# Patient Record
Sex: MALE | Race: WHITE | NOT HISPANIC OR LATINO | Employment: PART TIME | URBAN - METROPOLITAN AREA
[De-identification: names, ages, dates, MRNs, and addresses within clinical notes are randomized per-mention and may not be internally consistent; named-entity substitution may affect disease eponyms.]

---

## 2019-07-18 ENCOUNTER — TRANSCRIBE ORDERS (OUTPATIENT)
Dept: NUTRITION | Facility: HOSPITAL | Age: 44
End: 2019-07-18

## 2019-07-18 DIAGNOSIS — E66.9 CLASS 1 OBESITY WITH BODY MASS INDEX (BMI) OF 32.0 TO 32.9 IN ADULT, UNSPECIFIED OBESITY TYPE, UNSPECIFIED WHETHER SERIOUS COMORBIDITY PRESENT: ICD-10-CM

## 2019-07-18 DIAGNOSIS — E10.649 UNCONTROLLED TYPE 1 DIABETES MELLITUS WITH HYPOGLYCEMIA, UNSPECIFIED HYPOGLYCEMIA COMA STATUS (HCC): Primary | ICD-10-CM

## 2019-07-18 DIAGNOSIS — E78.00 PURE HYPERCHOLESTEROLEMIA: ICD-10-CM

## 2019-07-22 ENCOUNTER — TRANSCRIBE ORDERS (OUTPATIENT)
Dept: DIABETES SERVICES | Facility: HOSPITAL | Age: 44
End: 2019-07-22

## 2019-07-22 DIAGNOSIS — E10.649 UNCONTROLLED TYPE 1 DIABETES MELLITUS WITH HYPOGLYCEMIA WITHOUT COMA (HCC): ICD-10-CM

## 2019-07-22 DIAGNOSIS — E66.9 OBESITY, UNSPECIFIED CLASSIFICATION, UNSPECIFIED OBESITY TYPE, UNSPECIFIED WHETHER SERIOUS COMORBIDITY PRESENT: ICD-10-CM

## 2019-07-22 DIAGNOSIS — E10.649 TYPE 1 DIABETES MELLITUS WITH HYPOGLYCEMIA AND WITHOUT COMA (HCC): ICD-10-CM

## 2019-07-22 DIAGNOSIS — E78.00 ELEVATED CHOLESTEROL: Primary | ICD-10-CM

## 2019-07-24 ENCOUNTER — HOSPITAL ENCOUNTER (OUTPATIENT)
Dept: DIABETES SERVICES | Facility: HOSPITAL | Age: 44
Setting detail: RECURRING SERIES
Discharge: HOME | End: 2019-07-24

## 2019-07-24 PROCEDURE — G0108 DIAB MANAGE TRN  PER INDIV: HCPCS | Performed by: DIETITIAN, REGISTERED

## 2019-08-21 ENCOUNTER — HOSPITAL ENCOUNTER (OUTPATIENT)
Dept: DIABETES SERVICES | Facility: HOSPITAL | Age: 44
Setting detail: RECURRING SERIES
Discharge: HOME | End: 2019-08-21

## 2020-11-20 ENCOUNTER — OFFICE VISIT (OUTPATIENT)
Dept: ENDOCRINOLOGY | Facility: CLINIC | Age: 45
End: 2020-11-20

## 2020-11-20 VITALS
HEART RATE: 99 BPM | TEMPERATURE: 97.3 F | WEIGHT: 187.4 LBS | BODY MASS INDEX: 31.99 KG/M2 | OXYGEN SATURATION: 99 % | DIASTOLIC BLOOD PRESSURE: 74 MMHG | HEIGHT: 64 IN | SYSTOLIC BLOOD PRESSURE: 116 MMHG

## 2020-11-20 DIAGNOSIS — E10.65 UNCONTROLLED TYPE 1 DIABETES MELLITUS WITH HYPERGLYCEMIA (HCC): Primary | ICD-10-CM

## 2020-11-20 DIAGNOSIS — Z96.41 INSULIN PUMP IN PLACE: ICD-10-CM

## 2020-11-20 DIAGNOSIS — E10.649 HYPOGLYCEMIA DUE TO TYPE 1 DIABETES MELLITUS (HCC): ICD-10-CM

## 2020-11-20 PROBLEM — E10.29 TYPE 1 DIABETES MELLITUS WITH MICROALBUMINURIA: Status: ACTIVE | Noted: 2020-11-20

## 2020-11-20 PROBLEM — R80.9 TYPE 1 DIABETES MELLITUS WITH MICROALBUMINURIA: Status: ACTIVE | Noted: 2020-11-20

## 2020-11-20 PROBLEM — E10.319 TYPE 1 DIABETES MELLITUS WITH RETINOPATHY: Status: ACTIVE | Noted: 2020-11-20

## 2020-11-20 PROBLEM — E03.9 PRIMARY HYPOTHYROIDISM: Status: ACTIVE | Noted: 2020-11-20

## 2020-11-20 LAB
EXPIRATION DATE: NORMAL
HBA1C MFR BLD: 6.8 %
Lab: NORMAL

## 2020-11-20 PROCEDURE — 83036 HEMOGLOBIN GLYCOSYLATED A1C: CPT | Performed by: INTERNAL MEDICINE

## 2020-11-20 PROCEDURE — 95251 CONT GLUC MNTR ANALYSIS I&R: CPT | Performed by: INTERNAL MEDICINE

## 2020-11-20 PROCEDURE — 99214 OFFICE O/P EST MOD 30 MIN: CPT | Performed by: INTERNAL MEDICINE

## 2020-11-20 RX ORDER — ATORVASTATIN CALCIUM 20 MG/1
1 TABLET, FILM COATED ORAL DAILY
COMMUNITY
Start: 2020-11-19 | End: 2020-11-20 | Stop reason: SDUPTHER

## 2020-11-20 RX ORDER — GABAPENTIN 400 MG/1
800 CAPSULE ORAL 3 TIMES DAILY
Qty: 180 CAPSULE | Refills: 5 | Status: SHIPPED | OUTPATIENT
Start: 2020-11-20 | End: 2021-06-10

## 2020-11-20 RX ORDER — ASPIRIN 81 MG/1
81 TABLET ORAL DAILY
COMMUNITY

## 2020-11-20 RX ORDER — GABAPENTIN 400 MG/1
2 CAPSULE ORAL 3 TIMES DAILY
COMMUNITY
Start: 2020-11-19 | End: 2020-11-20 | Stop reason: SDUPTHER

## 2020-11-20 RX ORDER — LOSARTAN POTASSIUM 100 MG/1
100 TABLET ORAL DAILY
Qty: 90 TABLET | Refills: 3 | Status: SHIPPED | OUTPATIENT
Start: 2020-11-20 | End: 2021-06-28 | Stop reason: SDUPTHER

## 2020-11-20 RX ORDER — LEVOTHYROXINE SODIUM 0.1 MG/1
100 TABLET ORAL DAILY
Qty: 90 TABLET | Refills: 3 | Status: SHIPPED | OUTPATIENT
Start: 2020-11-20 | End: 2021-06-28 | Stop reason: SDUPTHER

## 2020-11-20 RX ORDER — ATORVASTATIN CALCIUM 20 MG/1
20 TABLET, FILM COATED ORAL DAILY
Qty: 90 TABLET | Refills: 3 | Status: SHIPPED | OUTPATIENT
Start: 2020-11-20 | End: 2021-06-28 | Stop reason: SDUPTHER

## 2020-11-20 RX ORDER — LOSARTAN POTASSIUM 100 MG/1
1 TABLET ORAL DAILY
COMMUNITY
Start: 2020-11-19 | End: 2020-11-20 | Stop reason: SDUPTHER

## 2020-11-20 RX ORDER — LEVOTHYROXINE SODIUM 0.1 MG/1
1 TABLET ORAL DAILY
COMMUNITY
Start: 2020-11-19 | End: 2020-11-20 | Stop reason: SDUPTHER

## 2020-11-20 NOTE — PROGRESS NOTES
"     Office Note      Date: 2020  Patient Name: Patricio Holman  MRN: [id number]  : 1975    Chief Complaint   Patient presents with   • Follow-up   • Diabetes       History of Present Illness:   Patricio Holman is a 45 y.o. male who presents for Diabetes type 1. Diagnosed in: . Treated in past with insulin. Current treatments: Tandem pump and DexCom G6. Number of insulin shots per day: none - on pump. Checks blood sugar none times a day. Has low blood sugar: occasional. Aspirin use: Yes. Statin use: Yes. ACE-I/ARB use: Yes. Changes in health since last visit: none. Last eye exam 2020.    Subjective      Diabetic Complications:  Eyes: Yes - retinopathy  Kidneys: Yes - microalbuminuria  Feet: No  Heart: No    Diet and Exercise:  Meals per day: 3  Minutes of exercise per week: 0 mins.    Review of Systems:   Review of Systems   Constitutional: Negative.    Cardiovascular: Negative.    Gastrointestinal: Negative.    Endocrine: Negative.        The following portions of the patient's history were reviewed and updated as appropriate: allergies, current medications, past family history, past medical history, past social history, past surgical history and problem list.    Objective       Visit Vitals  /74   Pulse 99   Temp 97.3 °F (36.3 °C) (Infrared)   Ht 161.3 cm (63.5\")   Wt 85 kg (187 lb 6.4 oz)   SpO2 99%   BMI 32.68 kg/m²       Physical Exam:  Physical Exam  Constitutional:       Appearance: Normal appearance.   Neurological:      Mental Status: He is alert.         Labs:    HbA1c  Lab Results   Component Value Date    HGBA1C 6.8 2020       CMP  No results found for: GLUCOSE, BUN, CREATININE, EGFRIFNONA, EGFRIFAFRI, BCR, K, CO2, CALCIUM, PROTENTOTREF, LABIL2, BILIRUBIN, AST, ALT     Lipid Panel        TSH  No results found for: TSH, FREET4     Hemoglobin A1C  Lab Results   Component Value Date    HGBA1C 6.8 2020        Microalbumin/Creatinine  No results found for: " MALBCRERATIO, CREATINIURIN, MICROALBUR        Assessment / Plan      Assessment & Plan:  Problem List Items Addressed This Visit        Endocrine    Uncontrolled type 1 diabetes mellitus with hyperglycemia (CMS/[facility]) - Primary    Current Assessment & Plan     Diabetes is improving with treatment.   Continue current treatment regimen.  Diabetes will be reassessed in 3 months.    A1c okay but having some nocturnal hypoglycemia.         Relevant Medications    Admelog 100 UNIT/ML injection    gabapentin (NEURONTIN) 400 MG capsule    Other Relevant Orders    POC Glycosylated Hemoglobin (Hb A1C) (Completed)    Hypoglycemia due to type 1 diabetes mellitus (CMS/[facility])    Current Assessment & Plan     Some lows overnight.  Will adjust pump.         Relevant Medications    Admelog 100 UNIT/ML injection       Other    Insulin pump in place           Return in about 3 months (around 2/20/2021) for Recheck with A1c, CMP, lipids, TSH, microalbumin.    Reinaldo Sykes MD   11/20/2020

## 2020-11-20 NOTE — ASSESSMENT & PLAN NOTE
Diabetes is improving with treatment.   Continue current treatment regimen.  Diabetes will be reassessed in 3 months.    A1c okay but having some nocturnal hypoglycemia.

## 2020-12-18 ENCOUNTER — TELEPHONE (OUTPATIENT)
Dept: ENDOCRINOLOGY | Facility: CLINIC | Age: 45
End: 2020-12-18

## 2021-02-10 RX ORDER — IBUPROFEN 600 MG/1
TABLET ORAL
Qty: 2 EACH | Refills: 1 | Status: SHIPPED | OUTPATIENT
Start: 2021-02-10 | End: 2021-06-28 | Stop reason: SDUPTHER

## 2021-02-26 ENCOUNTER — OFFICE VISIT (OUTPATIENT)
Dept: ENDOCRINOLOGY | Facility: CLINIC | Age: 46
End: 2021-02-26

## 2021-02-26 VITALS
HEART RATE: 91 BPM | WEIGHT: 191 LBS | OXYGEN SATURATION: 96 % | DIASTOLIC BLOOD PRESSURE: 64 MMHG | TEMPERATURE: 97.3 F | SYSTOLIC BLOOD PRESSURE: 104 MMHG | BODY MASS INDEX: 33.3 KG/M2

## 2021-02-26 DIAGNOSIS — R80.9 TYPE 1 DIABETES MELLITUS WITH MICROALBUMINURIA (HCC): ICD-10-CM

## 2021-02-26 DIAGNOSIS — Z96.41 INSULIN PUMP IN PLACE: ICD-10-CM

## 2021-02-26 DIAGNOSIS — E10.65 UNCONTROLLED TYPE 1 DIABETES MELLITUS WITH HYPERGLYCEMIA (HCC): Primary | ICD-10-CM

## 2021-02-26 DIAGNOSIS — E10.29 TYPE 1 DIABETES MELLITUS WITH MICROALBUMINURIA (HCC): ICD-10-CM

## 2021-02-26 DIAGNOSIS — E10.649 HYPOGLYCEMIA DUE TO TYPE 1 DIABETES MELLITUS (HCC): ICD-10-CM

## 2021-02-26 LAB
EXPIRATION DATE: NORMAL
HBA1C MFR BLD: 7.7 %
Lab: NORMAL

## 2021-02-26 PROCEDURE — 99214 OFFICE O/P EST MOD 30 MIN: CPT | Performed by: INTERNAL MEDICINE

## 2021-02-26 PROCEDURE — 83036 HEMOGLOBIN GLYCOSYLATED A1C: CPT | Performed by: INTERNAL MEDICINE

## 2021-02-26 PROCEDURE — 95251 CONT GLUC MNTR ANALYSIS I&R: CPT | Performed by: INTERNAL MEDICINE

## 2021-02-26 RX ORDER — FLUTICASONE PROPIONATE 50 MCG
2 SPRAY, SUSPENSION (ML) NASAL AS NEEDED
COMMUNITY
End: 2021-02-26 | Stop reason: SDUPTHER

## 2021-02-26 RX ORDER — FLUTICASONE PROPIONATE 50 MCG
2 SPRAY, SUSPENSION (ML) NASAL AS NEEDED
Qty: 9.9 ML | Refills: 5 | Status: SHIPPED | OUTPATIENT
Start: 2021-02-26 | End: 2021-06-28 | Stop reason: SDUPTHER

## 2021-02-26 NOTE — ASSESSMENT & PLAN NOTE
Diabetes is worsening.   Continue current treatment regimen. CGM download shows postprandial spikes.  Will adjust CHO ratio.  Diabetes will be reassessed in 3 months.

## 2021-02-26 NOTE — PROGRESS NOTES
Office Note      Date: 2021  Patient Name: Patricio Holman  MRN: [id number]  : 1975    Chief Complaint   Patient presents with   • Diabetes       History of Present Illness:   Patricio Holman is a 45 y.o. male who presents for Diabetes type 1. Diagnosed in: . Treated in past with insulin. Current treatments: Tandem pump and DexCom G6. Number of insulin shots per day: none - on pump. Checks blood sugar 288 times a day - on DexCom. Has low blood sugar: occasional. Aspirin use: Yes. Statin use: Yes. ACE-I/ARB use: Yes. Changes in health since last visit: none. Last eye exam 2020.    He had his COVID-19 shots.  He plans to travel soon to see a friend with stage IV cancer.      Subjective      Diabetic Complications:  Eyes: Yes - retinopathy  Kidneys: Yes - microalbuminuria  Feet: No  Heart: No    Diet and Exercise:  Meals per day: 3  Minutes of exercise per week: 0 mins.    Review of Systems:   Review of Systems   Constitutional: Negative.    Cardiovascular: Negative.    Gastrointestinal: Negative.    Endocrine: Negative.        The following portions of the patient's history were reviewed and updated as appropriate: allergies, current medications, past family history, past medical history, past social history, past surgical history and problem list.    Objective       Visit Vitals  /64   Pulse 91   Temp 97.3 °F (36.3 °C) (Infrared)   Wt 86.6 kg (191 lb)   SpO2 96%   BMI 33.30 kg/m²       Physical Exam:  Physical Exam  Constitutional:       Appearance: Normal appearance.   Neurological:      Mental Status: He is alert.         Labs:    HbA1c  Lab Results   Component Value Date    HGBA1C 7.7 2021       CMP  No results found for: GLUCOSE, BUN, CREATININE, EGFRIFNONA, EGFRIFAFRI, BCR, K, CO2, CALCIUM, PROTENTOTREF, LABIL2, BILIRUBIN, AST, ALT     Lipid Panel        TSH  No results found for: TSH, FREET4     Hemoglobin A1C  Lab Results   Component Value Date    HGBA1C 7.7 2021         Microalbumin/Creatinine  No results found for: MALBCRERATIO, CREATINIURIN, MICROALBUR        Assessment / Plan      Assessment & Plan:  Diagnoses and all orders for this visit:    1. Uncontrolled type 1 diabetes mellitus with hyperglycemia (CMS/[facility]) (Primary)  Assessment & Plan:  Diabetes is worsening.   Continue current treatment regimen. CGM download shows postprandial spikes.  Will adjust CHO ratio.  Diabetes will be reassessed in 3 months.    Orders:  -     POC Glycosylated Hemoglobin (Hb A1C)    2. Type 1 diabetes mellitus with microalbuminuria (CMS/[facility])  Assessment & Plan:  Continue ARB.      3. Insulin pump in place    4. Hypoglycemia due to type 1 diabetes mellitus (CMS/[facility])  Assessment & Plan:  Continue CGM.      Other orders  -     fluticasone (FLONASE) 50 MCG/ACT nasal spray; 2 sprays into the nostril(s) as directed by provider As Needed for Rhinitis.  Dispense: 9.9 mL; Refill: 5      Return in about 3 months (around 5/26/2021) for Recheck with A1c, CMP, lipids, TSH, microalbumin.    Reinaldo Sykes MD   02/26/2021

## 2021-04-12 ENCOUNTER — OFFICE VISIT (OUTPATIENT)
Dept: ENDOCRINOLOGY | Facility: CLINIC | Age: 46
End: 2021-04-12

## 2021-04-12 VITALS
OXYGEN SATURATION: 96 % | DIASTOLIC BLOOD PRESSURE: 64 MMHG | SYSTOLIC BLOOD PRESSURE: 124 MMHG | HEART RATE: 86 BPM | HEIGHT: 63 IN | WEIGHT: 186 LBS | BODY MASS INDEX: 32.96 KG/M2

## 2021-04-12 DIAGNOSIS — L03.311 CELLULITIS OF ABDOMINAL WALL: Primary | ICD-10-CM

## 2021-04-12 PROCEDURE — 99213 OFFICE O/P EST LOW 20 MIN: CPT | Performed by: INTERNAL MEDICINE

## 2021-04-12 RX ORDER — CEPHALEXIN 500 MG/1
500 CAPSULE ORAL 3 TIMES DAILY
Qty: 21 CAPSULE | Refills: 0 | Status: SHIPPED | OUTPATIENT
Start: 2021-04-12 | End: 2021-06-28

## 2021-04-12 NOTE — PROGRESS NOTES
"     Office Note      Date: 2021  Patient Name: Patricio Holman  MRN: [id number]  : 1975    Chief complaint:  Infected insulin infusion site    He noted his glucose increased unexpectedly early Friday morning.  He kept giving more insulin boluses but glucose stayed elevated.  He changed out the infusion set on Saturday.  He noted some redness and tenderness at the old infusion set.  He denies any fevers or chills.  He hasn't noted any purulent drainage.  He has used peroxide on the site.      History of Present Illness:   Patricio Holman is a 45 y.o. male who presents for Diabetes type 1.     Subjective     Review of Systems:   Review of Systems   Constitutional: Negative.    Cardiovascular: Negative.    Gastrointestinal: Negative.    Endocrine: Negative.        The following portions of the patient's history were reviewed and updated as appropriate: allergies, current medications, past family history, past medical history, past social history, past surgical history and problem list.    Objective       Visit Vitals  /64 (BP Location: Right arm, Patient Position: Sitting, Cuff Size: Adult)   Pulse 86   Ht 160 cm (63\")   Wt 84.4 kg (186 lb)   SpO2 96%   BMI 32.95 kg/m²       Physical Exam:  Physical Exam  Skin:     Comments: 3cm area of erythema and induration in the right lower quadrant with no purulent drainage noted   Neurological:      Mental Status: He is alert.         Labs:    HbA1c  Lab Results   Component Value Date    HGBA1C 7.7 2021       CMP  No results found for: GLUCOSE, BUN, CREATININE, EGFRIFNONA, EGFRIFAFRI, BCR, K, CO2, CALCIUM, PROTENTOTREF, LABIL2, BILIRUBIN, AST, ALT     Lipid Panel        TSH  No results found for: TSH, FREET4     Hemoglobin A1C  Lab Results   Component Value Date    HGBA1C 7.7 2021        Microalbumin/Creatinine  No results found for: MALBCRERATIO, CREATINIURIN, MICROALBUR        Assessment / Plan      Assessment & Plan:  Diagnoses and all " orders for this visit:    1. Cellulitis of abdominal wall (Primary)  Assessment & Plan:  He has cellulitis at old infusion site.  Will prescribe oral antibiotics.  He will observe the site and let us know if it doesn't improve.      Other orders  -     cephalexin (Keflex) 500 MG capsule; Take 1 capsule by mouth 3 (Three) Times a Day.  Dispense: 21 capsule; Refill: 0      Return for Next scheduled follow up with A1c, CMP, lipids, TSH, microalbumin.    Reinaldo Sykes MD   04/12/2021

## 2021-04-12 NOTE — ASSESSMENT & PLAN NOTE
He has cellulitis at old infusion site.  Will prescribe oral antibiotics.  He will observe the site and let us know if it doesn't improve.

## 2021-06-10 DIAGNOSIS — E10.65 UNCONTROLLED TYPE 1 DIABETES MELLITUS WITH HYPERGLYCEMIA (HCC): ICD-10-CM

## 2021-06-10 RX ORDER — GABAPENTIN 400 MG/1
CAPSULE ORAL
Qty: 180 CAPSULE | Refills: 3 | Status: SHIPPED | OUTPATIENT
Start: 2021-06-10 | End: 2021-06-28 | Stop reason: SDUPTHER

## 2021-06-28 ENCOUNTER — LAB (OUTPATIENT)
Dept: LAB | Facility: HOSPITAL | Age: 46
End: 2021-06-28

## 2021-06-28 ENCOUNTER — OFFICE VISIT (OUTPATIENT)
Dept: ENDOCRINOLOGY | Facility: CLINIC | Age: 46
End: 2021-06-28

## 2021-06-28 VITALS
WEIGHT: 188 LBS | OXYGEN SATURATION: 96 % | HEIGHT: 63 IN | HEART RATE: 64 BPM | BODY MASS INDEX: 33.31 KG/M2 | SYSTOLIC BLOOD PRESSURE: 126 MMHG | DIASTOLIC BLOOD PRESSURE: 66 MMHG

## 2021-06-28 DIAGNOSIS — R80.9 TYPE 1 DIABETES MELLITUS WITH MICROALBUMINURIA (HCC): ICD-10-CM

## 2021-06-28 DIAGNOSIS — E03.9 PRIMARY HYPOTHYROIDISM: ICD-10-CM

## 2021-06-28 DIAGNOSIS — E10.65 UNCONTROLLED TYPE 1 DIABETES MELLITUS WITH HYPERGLYCEMIA (HCC): Primary | ICD-10-CM

## 2021-06-28 DIAGNOSIS — E10.649 HYPOGLYCEMIA DUE TO TYPE 1 DIABETES MELLITUS (HCC): ICD-10-CM

## 2021-06-28 DIAGNOSIS — E10.65 UNCONTROLLED TYPE 1 DIABETES MELLITUS WITH HYPERGLYCEMIA (HCC): ICD-10-CM

## 2021-06-28 DIAGNOSIS — E10.29 TYPE 1 DIABETES MELLITUS WITH MICROALBUMINURIA (HCC): ICD-10-CM

## 2021-06-28 DIAGNOSIS — Z96.41 INSULIN PUMP IN PLACE: ICD-10-CM

## 2021-06-28 LAB
ALBUMIN SERPL-MCNC: 4.7 G/DL (ref 3.5–5.2)
ALBUMIN UR-MCNC: 4.3 MG/DL
ALBUMIN/GLOB SERPL: 1.6 G/DL
ALP SERPL-CCNC: 113 U/L (ref 39–117)
ALT SERPL W P-5'-P-CCNC: 30 U/L (ref 1–41)
ANION GAP SERPL CALCULATED.3IONS-SCNC: 11.6 MMOL/L (ref 5–15)
AST SERPL-CCNC: 23 U/L (ref 1–40)
BILIRUB SERPL-MCNC: 0.3 MG/DL (ref 0–1.2)
BUN SERPL-MCNC: 14 MG/DL (ref 6–20)
BUN/CREAT SERPL: 14 (ref 7–25)
CALCIUM SPEC-SCNC: 9.4 MG/DL (ref 8.6–10.5)
CHLORIDE SERPL-SCNC: 99 MMOL/L (ref 98–107)
CHOLEST SERPL-MCNC: 150 MG/DL (ref 0–200)
CO2 SERPL-SCNC: 26.4 MMOL/L (ref 22–29)
CREAT SERPL-MCNC: 1 MG/DL (ref 0.76–1.27)
CREAT UR-MCNC: 61.1 MG/DL
EXPIRATION DATE: ABNORMAL
EXPIRATION DATE: NORMAL
GFR SERPL CREATININE-BSD FRML MDRD: 80 ML/MIN/1.73
GLOBULIN UR ELPH-MCNC: 3 GM/DL
GLUCOSE BLDC GLUCOMTR-MCNC: 137 MG/DL (ref 70–130)
GLUCOSE SERPL-MCNC: 112 MG/DL (ref 65–99)
HBA1C MFR BLD: 7 %
HDLC SERPL-MCNC: 56 MG/DL (ref 40–60)
LDLC SERPL CALC-MCNC: 81 MG/DL (ref 0–100)
LDLC/HDLC SERPL: 1.45 {RATIO}
Lab: ABNORMAL
Lab: NORMAL
MICROALBUMIN/CREAT UR: 70.4 MG/G
POTASSIUM SERPL-SCNC: 3.6 MMOL/L (ref 3.5–5.2)
PROT SERPL-MCNC: 7.7 G/DL (ref 6–8.5)
SODIUM SERPL-SCNC: 137 MMOL/L (ref 136–145)
TRIGL SERPL-MCNC: 64 MG/DL (ref 0–150)
TSH SERPL DL<=0.05 MIU/L-ACNC: 1.68 UIU/ML (ref 0.27–4.2)
VLDLC SERPL-MCNC: 13 MG/DL (ref 5–40)

## 2021-06-28 PROCEDURE — 80053 COMPREHEN METABOLIC PANEL: CPT

## 2021-06-28 PROCEDURE — 83036 HEMOGLOBIN GLYCOSYLATED A1C: CPT | Performed by: INTERNAL MEDICINE

## 2021-06-28 PROCEDURE — 99214 OFFICE O/P EST MOD 30 MIN: CPT | Performed by: INTERNAL MEDICINE

## 2021-06-28 PROCEDURE — 82043 UR ALBUMIN QUANTITATIVE: CPT

## 2021-06-28 PROCEDURE — 84443 ASSAY THYROID STIM HORMONE: CPT

## 2021-06-28 PROCEDURE — 82570 ASSAY OF URINE CREATININE: CPT

## 2021-06-28 PROCEDURE — 80061 LIPID PANEL: CPT

## 2021-06-28 PROCEDURE — 95251 CONT GLUC MNTR ANALYSIS I&R: CPT | Performed by: INTERNAL MEDICINE

## 2021-06-28 RX ORDER — ATORVASTATIN CALCIUM 20 MG/1
20 TABLET, FILM COATED ORAL DAILY
Qty: 90 TABLET | Refills: 3 | Status: SHIPPED | OUTPATIENT
Start: 2021-06-28 | End: 2021-06-28 | Stop reason: SDUPTHER

## 2021-06-28 RX ORDER — LEVOTHYROXINE SODIUM 0.1 MG/1
100 TABLET ORAL DAILY
Qty: 90 TABLET | Refills: 3 | Status: SHIPPED | OUTPATIENT
Start: 2021-06-28 | End: 2021-07-01 | Stop reason: SDUPTHER

## 2021-06-28 RX ORDER — LOSARTAN POTASSIUM 100 MG/1
100 TABLET ORAL DAILY
Qty: 90 TABLET | Refills: 3 | Status: SHIPPED | OUTPATIENT
Start: 2021-06-28 | End: 2021-11-10 | Stop reason: SDUPTHER

## 2021-06-28 RX ORDER — IBUPROFEN 600 MG/1
TABLET ORAL
Qty: 2 EACH | Refills: 1 | Status: SHIPPED | OUTPATIENT
Start: 2021-06-28

## 2021-06-28 RX ORDER — GABAPENTIN 400 MG/1
CAPSULE ORAL
Qty: 180 CAPSULE | Refills: 5 | Status: SHIPPED | OUTPATIENT
Start: 2021-06-28 | End: 2021-11-10 | Stop reason: SDUPTHER

## 2021-06-28 RX ORDER — LEVOTHYROXINE SODIUM 0.1 MG/1
100 TABLET ORAL DAILY
Qty: 90 TABLET | Refills: 3 | Status: SHIPPED | OUTPATIENT
Start: 2021-06-28 | End: 2021-06-28 | Stop reason: SDUPTHER

## 2021-06-28 RX ORDER — FLUTICASONE PROPIONATE 50 MCG
2 SPRAY, SUSPENSION (ML) NASAL AS NEEDED
Qty: 9.9 ML | Refills: 5 | Status: SHIPPED | OUTPATIENT
Start: 2021-06-28 | End: 2021-11-10 | Stop reason: SDUPTHER

## 2021-06-28 RX ORDER — ATORVASTATIN CALCIUM 20 MG/1
20 TABLET, FILM COATED ORAL DAILY
Qty: 90 TABLET | Refills: 3 | Status: SHIPPED | OUTPATIENT
Start: 2021-06-28 | End: 2021-07-01 | Stop reason: SDUPTHER

## 2021-06-28 NOTE — ASSESSMENT & PLAN NOTE
Diabetes is improving with treatment.   Continue current treatment regimen.  Diabetes will be reassessed in 3 months.    A1c looks great!    CGM shows some variability but no clear pattern for pump adjustments.

## 2021-06-28 NOTE — PROGRESS NOTES
"     Office Note      Date: 2021  Patient Name: Patricio Holman  MRN: [id number]  : 1975    Chief Complaint   Patient presents with   • Diabetes       History of Present Illness:   Patricio Holman is a 45 y.o. male who presents for Diabetes type 1. Diagnosed in: . Treated in past with insulin. Current treatments: Tandem pump and DexCom G6. Number of insulin shots per day: none - on pump. Checks blood sugar 288 times a day - on DexCom. Has low blood sugar: occasional. Aspirin use: Yes. Statin use: Yes. ACE-I/ARB use: Yes. Changes in health since last visit: none. Last eye exam 2020.    He remains on T4 100mcg qd.  He is taking this correctly.  He isn't taking any interfering meds concurrently.  He denies any sxs of hypo- or hyperthyroidism at this time.    Subjective      Diabetic Complications:  Eyes: Yes - retinopathy  Kidneys: Yes - microalbuminuria  Feet: No  Heart: No    Diet and Exercise:  Meals per day: 3  Minutes of exercise per week: 0 mins.    Review of Systems:   Review of Systems   Constitutional: Positive for fatigue.   Cardiovascular: Negative.    Gastrointestinal: Negative.    Endocrine: Negative.        The following portions of the patient's history were reviewed and updated as appropriate: allergies, current medications, past family history, past medical history, past social history, past surgical history and problem list.    Objective       Visit Vitals  /66 (BP Location: Right arm, Patient Position: Sitting, Cuff Size: Adult)   Pulse 64   Ht 160 cm (63\")   Wt 85.3 kg (188 lb)   SpO2 96%   BMI 33.30 kg/m²       Physical Exam:  Physical Exam  Constitutional:       Appearance: Normal appearance.   Neck:      Thyroid: No thyroid mass, thyromegaly or thyroid tenderness.   Cardiovascular:      Pulses:           Dorsalis pedis pulses are 2+ on the left side.        Posterior tibial pulses are 2+ on the right side and 2+ on the left side.   Musculoskeletal:      Left foot: " Deformity present.   Feet:      Right foot:      Protective Sensation: 5 sites tested. 5 sites sensed.      Skin integrity: Skin integrity normal.      Toenail Condition: Right toenails are normal.      Left foot:      Protective Sensation: 5 sites tested. 5 sites sensed.      Skin integrity: Skin integrity normal.      Toenail Condition: Left toenails are normal.      Comments: Mild club foot deformity  Lymphadenopathy:      Cervical: No cervical adenopathy.   Neurological:      Mental Status: He is alert.         Labs:    HbA1c  Lab Results   Component Value Date    HGBA1C 7.0 06/28/2021       CMP  No results found for: GLUCOSE, BUN, CREATININE, EGFRIFNONA, EGFRIFAFRI, BCR, K, CO2, CALCIUM, PROTENTOTREF, LABIL2, BILIRUBIN, AST, ALT     Lipid Panel        TSH  No results found for: TSH, FREET4     Hemoglobin A1C  Lab Results   Component Value Date    HGBA1C 7.0 06/28/2021        Microalbumin/Creatinine  No results found for: MALBCRERATIO, CREATINIURIN, MICROALBUR        Assessment / Plan      Assessment & Plan:  Diagnoses and all orders for this visit:    1. Uncontrolled type 1 diabetes mellitus with hyperglycemia (CMS/HCC) (Primary)  Assessment & Plan:  Diabetes is improving with treatment.   Continue current treatment regimen.  Diabetes will be reassessed in 3 months.    A1c looks great!    CGM shows some variability but no clear pattern for pump adjustments.    Orders:  -     POC Glycosylated Hemoglobin (Hb A1C)  -     POC Glucose, Blood  -     Comprehensive Metabolic Panel; Future  -     Lipid Panel; Future  -     Microalbumin / Creatinine Urine Ratio - Urine, Clean Catch; Future  -     gabapentin (NEURONTIN) 400 MG capsule; Take 2 po TID  Dispense: 180 capsule; Refill: 5    2. Hypoglycemia due to type 1 diabetes mellitus (CMS/HCC)  Assessment & Plan:  Continue CGM.  We discussed using temp basal when more active.      3. Insulin pump in place    4. Type 1 diabetes mellitus with microalbuminuria  (CMS/[facility])  Assessment & Plan:  Continue ARB.  Check microalbumin today.      5. Primary hypothyroidism  Assessment & Plan:  Continue T4.  Check TSH today.    Orders:  -     TSH; Future    Other orders  -     Discontinue: Admelog 100 UNIT/ML injection; Use as directed in insulin pump - max daily dose 100u  Dispense: 90 mL; Refill: 3  -     Discontinue: atorvastatin (LIPITOR) 20 MG tablet; Take 1 tablet by mouth Daily.  Dispense: 90 tablet; Refill: 3  -     fluticasone (FLONASE) 50 MCG/ACT nasal spray; 2 sprays into the nostril(s) as directed by provider As Needed for Rhinitis.  Dispense: 9.9 mL; Refill: 5  -     Glucagon, rDNA, (Glucagon Emergency) 1 MG kit; Use as directed for severe hypoglycemia  Dispense: 2 each; Refill: 1  -     Discontinue: levothyroxine (SYNTHROID, LEVOTHROID) 100 MCG tablet; Take 1 tablet by mouth Daily.  Dispense: 90 tablet; Refill: 3  -     losartan (COZAAR) 100 MG tablet; Take 1 tablet by mouth Daily.  Dispense: 90 tablet; Refill: 3  -     Admelog 100 UNIT/ML injection; Use as directed in insulin pump - max daily dose 100u  Dispense: 90 mL; Refill: 3  -     atorvastatin (LIPITOR) 20 MG tablet; Take 1 tablet by mouth Daily.  Dispense: 90 tablet; Refill: 3  -     levothyroxine (SYNTHROID, LEVOTHROID) 100 MCG tablet; Take 1 tablet by mouth Daily.  Dispense: 90 tablet; Refill: 3      Return in about 3 months (around 9/28/2021) for Recheck with A1c.    Reinaldo Sykes MD   06/28/2021

## 2021-07-01 RX ORDER — ATORVASTATIN CALCIUM 20 MG/1
20 TABLET, FILM COATED ORAL DAILY
Qty: 90 TABLET | Refills: 1 | Status: SHIPPED | OUTPATIENT
Start: 2021-07-01 | End: 2021-11-10 | Stop reason: SDUPTHER

## 2021-07-01 RX ORDER — LEVOTHYROXINE SODIUM 0.1 MG/1
100 TABLET ORAL DAILY
Qty: 90 TABLET | Refills: 1 | Status: SHIPPED | OUTPATIENT
Start: 2021-07-01 | End: 2021-11-10 | Stop reason: SDUPTHER

## 2021-08-02 ENCOUNTER — OFFICE VISIT (OUTPATIENT)
Dept: ORTHOPEDIC SURGERY | Facility: CLINIC | Age: 46
End: 2021-08-02

## 2021-08-02 VITALS
HEART RATE: 74 BPM | BODY MASS INDEX: 32.07 KG/M2 | SYSTOLIC BLOOD PRESSURE: 133 MMHG | DIASTOLIC BLOOD PRESSURE: 78 MMHG | WEIGHT: 181 LBS | HEIGHT: 63 IN

## 2021-08-02 DIAGNOSIS — M25.511 RIGHT SHOULDER PAIN, UNSPECIFIED CHRONICITY: Primary | ICD-10-CM

## 2021-08-02 PROCEDURE — 99204 OFFICE O/P NEW MOD 45 MIN: CPT | Performed by: ORTHOPAEDIC SURGERY

## 2021-08-02 RX ORDER — MELOXICAM 7.5 MG/1
TABLET ORAL
Qty: 90 TABLET | Refills: 2 | Status: SHIPPED | OUTPATIENT
Start: 2021-08-02 | End: 2022-04-08

## 2021-08-02 NOTE — PROGRESS NOTES
[facility] Orthopaedic Surgery Clinic Note    Subjective     CC: Pain of the Right Shoulder      HPI  Patricio Holman is a 45 y.o. male who presents with new problem of: right shoulder pain.  Onset: atraumatic and gradual in nature. The issue has been ongoing for 1 year(s). Pain is a 7/10 on the pain scale. Pain is described as aching and throbbing. Associated symptoms include pain. The pain is worse with sleeping and lying on affected sidePrevious treatments have included: nothing.    I have reviewed the following portions of the patient's history:History of Present Illness and review of systems.    He has seen a chiropractor.  He broughtchiropractor.  No other treatment.    Review of Systems   Constitutional: Negative.  Negative for chills, fatigue and fever.   HENT: Negative.  Negative for congestion and dental problem.    Eyes: Negative.  Negative for blurred vision.   Respiratory: Negative.  Negative for shortness of breath.    Cardiovascular: Negative.  Negative for leg swelling.   Gastrointestinal: Negative.  Negative for abdominal pain.   Endocrine: Negative.  Negative for polyuria.   Genitourinary: Negative.  Negative for difficulty urinating.   Musculoskeletal: Positive for arthralgias.   Skin: Negative.    Allergic/Immunologic: Negative.    Neurological: Negative.    Hematological: Negative.  Negative for adenopathy.   Psychiatric/Behavioral: Negative.  Negative for behavioral problems.       ROS:    Constiutional:Pt denies fever, chills, nausea, or vomiting.  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    Patient is well nourished and well developed.        Ortho Exam  Decrease for flexion and abduction actively to only 90 degrees.  Passively about 120.  Rotator cuff strength 4-5.  Positive impingement.    Imaging/Labs/EMG Reviewed:  Imaging Results (Last 24 Hours)     Procedure Component Value Units Date/Time    XR Shoulder 2+ View Right [[id number]] Resulted: 08/02/21 1607     Updated: 08/02/21 1608    Narrative:      Right Shoulder X-Ray  Indication: Pain  AP, scapular Y, and axillary lateral views    Findings:  No fracture  No bony lesion  Normal soft tissues  Normal joint spaces    No prior studies were available for comparison.            Assessment:  1. Right shoulder pain, unspecified chronicity        Plan:  1. Recommend over the counter anti-inflammatories for pain and/or swelling  2. Have ordered physical therapy.  3. I have ordered Mobic to take daily.  4. He will follow up in 4 weeks and if not better consider cortisone injection or MRI    Follow Up:   Return in about 1 month (around 9/2/2021).      Medical Decision Making  Management Options : Low - OT or PT Therapy  and Moderate - RX Drug management         Massimo Cueto M.D., [facility]  Orthopedic Surgeon  Fellowship Trained Sports Medicine  [facility]  Orthopedics and Sports Medicine  [address] [place] [address], [address]  [place] [address]    EMR Dragon/Transcription disclaimer:  Much of this encounter note is an electronic transcription of spoken language to printed text. Electronic transcription of spoken " language may permit erroneous, or at times, nonsensical words or phrases to be inadvertently transcribed. Although I have reviewed the note for such errors, some may still exist.

## 2021-08-10 ENCOUNTER — TELEPHONE (OUTPATIENT)
Dept: ENDOCRINOLOGY | Facility: CLINIC | Age: 46
End: 2021-08-10

## 2021-08-10 NOTE — TELEPHONE ENCOUNTER
Admelog not covered  By insurance. Shows that Humalog  should be covered. Please add and send . pb

## 2021-08-11 ENCOUNTER — TELEPHONE (OUTPATIENT)
Dept: ORTHOPEDIC SURGERY | Facility: CLINIC | Age: 46
End: 2021-08-11

## 2021-08-11 DIAGNOSIS — M25.511 RIGHT SHOULDER PAIN, UNSPECIFIED CHRONICITY: Primary | ICD-10-CM

## 2021-08-11 NOTE — TELEPHONE ENCOUNTER
Provider: DR. WILSON  Caller: Lor ZULUAGA  Relationship to Patient:  OCCUPATIONAL THERAPIST     Phone Number: [phone]  Reason for Call: April ADVISED PATIENT HAS A ORDER FOR FOR PHYSICAL THERAPY AND SHE NEEDS DR. MORROW TO SEND A NEW ORDER FOR OCCUPATIONAL THERAPY. PLEASE SEND THE ORDER VIA FAX [phone]. PATIENT IS CURRENTLY AT THE OFFICE.

## 2021-08-24 ENCOUNTER — TELEPHONE (OUTPATIENT)
Dept: ENDOCRINOLOGY | Facility: CLINIC | Age: 46
End: 2021-08-24

## 2021-08-25 NOTE — TELEPHONE ENCOUNTER
AALIYAH pt. And advised Dr. Sykes changed rx. To [place], pt. Will call pharmacy to get filled today. SON

## 2021-09-01 ENCOUNTER — TELEPHONE (OUTPATIENT)
Dept: ENDOCRINOLOGY | Facility: CLINIC | Age: 46
End: 2021-09-01

## 2021-09-01 NOTE — TELEPHONE ENCOUNTER
Pt called states he was put on a new Medication Mobic 7.5 mg by Dr Massimo irizarry pt want to know if he contiune taking Asprin with this new medication. He was told to call Dr Sykes. Pt last seen 06/28/21 pt next appt 11/10/21. Please notify pt

## 2021-09-01 NOTE — TELEPHONE ENCOUNTER
Patient notified and verbalized understanding.  He states that the humalog had given him sweats and headaches in the past.  Asking if we can switch to novolog.

## 2021-09-08 ENCOUNTER — OFFICE VISIT (OUTPATIENT)
Dept: ORTHOPEDIC SURGERY | Facility: CLINIC | Age: 46
End: 2021-09-08

## 2021-09-08 VITALS
SYSTOLIC BLOOD PRESSURE: 141 MMHG | DIASTOLIC BLOOD PRESSURE: 77 MMHG | HEIGHT: 63 IN | HEART RATE: 78 BPM | BODY MASS INDEX: 31.89 KG/M2 | WEIGHT: 180 LBS

## 2021-09-08 DIAGNOSIS — M25.511 RIGHT SHOULDER PAIN, UNSPECIFIED CHRONICITY: Primary | ICD-10-CM

## 2021-09-08 PROCEDURE — 99213 OFFICE O/P EST LOW 20 MIN: CPT | Performed by: ORTHOPAEDIC SURGERY

## 2021-09-08 NOTE — PROGRESS NOTES
[facility] [place] [facility] Note    Subjective     CC: Follow-up (5 week f/u Right shoulder pain)      HPI    Patricio Holman is a 45 y.o. male.  He is doing well.  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  Mobic is helping.    Review of Systems   Constitutional: Negative for chills, fatigue and fever.   HENT: Negative.  Negative for congestion and dental problem.    Eyes: Negative.  Negative for blurred vision.   Respiratory: Negative.  Negative for shortness of breath.    Cardiovascular: Negative.  Negative for leg swelling.   Gastrointestinal: Negative.  Negative for abdominal pain.   Endocrine: Positive for heat intolerance. Negative for polyuria.   Genitourinary: Negative.  Negative for difficulty urinating.   Musculoskeletal: Positive for arthralgias and back pain.   Skin: Negative.    Allergic/Immunologic: Positive for environmental allergies.   Neurological: Positive for weakness and numbness.   Hematological: Negative.  Negative for adenopathy.   Psychiatric/Behavioral: Positive for agitation. Negative for behavioral problems. The patient is nervous/anxious.        ROS:    Constiutional:Pt denies fever, chills, nausea, or vomiting.  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    Patient is well nourished and well developed.        Ortho Exam  He has full forward elevation.  He lacks internal and external rotation.    Imaging/Labs/EMG Reviewed:  Imaging Results (Last 24 Hours)     ** No results found for the last 24 hours. **          Assessment:  1. Right shoulder pain, unspecified chronicity        Plan:  1. Recommend over the counter anti-inflammatories for pain and/or swelling  2. He will continue physical therapy/occupational therapy in [place].  Follow-up in 1 month.  If not better consider cortisone injection or MRI    Follow Up:   Return in about 1 month (around 10/8/2021).      Medical Decision Making  Management Options : Low - OT or PT Therapy         Massimo Cueto M.D., [facility]  Orthopedic Surgeon  Fellowship Trained Sports Medicine  [facility]  Orthopedics and Sports Medicine  [address], [address]  [place] [address]    EMR Dragon/Transcription disclaimer:  Much of this encounter note is an electronic transcription of spoken language to printed text. Electronic transcription of spoken language may permit erroneous, or at times, nonsensical words or phrases to be inadvertently transcribed. Although I have reviewed the note for such errors, some may still exist.  "

## 2021-10-18 ENCOUNTER — TELEPHONE (OUTPATIENT)
Dept: ENDOCRINOLOGY | Facility: CLINIC | Age: 46
End: 2021-10-18

## 2021-10-18 NOTE — TELEPHONE ENCOUNTER
Spoke with patient.  Wanted Dr. Sykes to review his readings and adjust his settings accordingly.  Printed dexcom data and tandem settings and placed in Dr. Sykes box.

## 2021-10-25 ENCOUNTER — OFFICE VISIT (OUTPATIENT)
Dept: ORTHOPEDIC SURGERY | Facility: CLINIC | Age: 46
End: 2021-10-25

## 2021-10-25 VITALS
DIASTOLIC BLOOD PRESSURE: 73 MMHG | WEIGHT: 179.9 LBS | SYSTOLIC BLOOD PRESSURE: 142 MMHG | HEART RATE: 81 BPM | BODY MASS INDEX: 31.88 KG/M2 | HEIGHT: 63 IN

## 2021-10-25 DIAGNOSIS — M25.511 RIGHT SHOULDER PAIN, UNSPECIFIED CHRONICITY: Primary | ICD-10-CM

## 2021-10-25 PROCEDURE — 99213 OFFICE O/P EST LOW 20 MIN: CPT | Performed by: ORTHOPAEDIC SURGERY

## 2021-10-25 NOTE — PROGRESS NOTES
"      [facility] Note    Subjective     CC: Follow-up ( 6 weeks- Right shoulder pain)      HPI    Patricio Holman is a 46 y.o. male.  He says is getting better.  He has physical therapy in [place].    Review of Systems   Constitutional: Negative.  Negative for chills, fatigue and fever.   HENT: Negative.  Negative for congestion and dental problem.    Eyes: Negative.  Negative for blurred vision.   Respiratory: Negative.  Negative for shortness of breath.    Cardiovascular: Negative.  Negative for leg swelling.   Gastrointestinal: Negative.  Negative for abdominal pain.   Endocrine: Negative.  Negative for polyuria.   Genitourinary: Negative.  Negative for difficulty urinating.   Musculoskeletal: Positive for arthralgias.   Skin: Negative.    Allergic/Immunologic: Negative.    Neurological: Negative.    Hematological: Negative.  Negative for adenopathy.   Psychiatric/Behavioral: Negative.  Negative for behavioral problems.       ROS:    Constiutional:Pt denies fever, chills, nausea, or vomiting.  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    Patient is well nourished and well developed.  "       Ortho Exam  He has better motion.  Near full strength.    Imaging/Labs/EMG Reviewed:  Imaging Results (Last 24 Hours)     ** No results found for the last 24 hours. **          Assessment:  1. Right shoulder pain, unspecified chronicity        Plan:  1. Recommend over the counter anti-inflammatories for pain and/or swelling  2. He will continue physical therapy.  He is doing well and does not want a shot.  I will see him back in a month.  He is working full duty.    Follow Up:   Return in about 1 month (around 11/25/2021).      Medical Decision Making  Management Options : Low - OT or PT Therapy         Massimo Cueto M.D., [facility]  Orthopedic Surgeon  Fellowship Trained Sports Medicine  [facility]  Orthopedics and Sports Medicine  [address] [place] [address], [address]  [place] [address]    EMR Dragon/Transcription disclaimer:  Much of this encounter note is an electronic transcription of spoken language to printed text. Electronic transcription of spoken language may permit erroneous, or at times, nonsensical words or phrases to be inadvertently transcribed. Although I have reviewed the note for such errors, some may still exist.

## 2021-11-10 ENCOUNTER — OFFICE VISIT (OUTPATIENT)
Dept: ENDOCRINOLOGY | Facility: CLINIC | Age: 46
End: 2021-11-10

## 2021-11-10 VITALS
OXYGEN SATURATION: 98 % | DIASTOLIC BLOOD PRESSURE: 74 MMHG | WEIGHT: 183 LBS | HEIGHT: 63 IN | SYSTOLIC BLOOD PRESSURE: 117 MMHG | HEART RATE: 82 BPM | BODY MASS INDEX: 32.43 KG/M2

## 2021-11-10 DIAGNOSIS — E66.09 CLASS 1 OBESITY DUE TO EXCESS CALORIES WITH SERIOUS COMORBIDITY AND BODY MASS INDEX (BMI) OF 32.0 TO 32.9 IN ADULT: ICD-10-CM

## 2021-11-10 DIAGNOSIS — E10.65 UNCONTROLLED TYPE 1 DIABETES MELLITUS WITH HYPERGLYCEMIA (HCC): Primary | ICD-10-CM

## 2021-11-10 DIAGNOSIS — E10.29 TYPE 1 DIABETES MELLITUS WITH MICROALBUMINURIA (HCC): ICD-10-CM

## 2021-11-10 DIAGNOSIS — E03.9 PRIMARY HYPOTHYROIDISM: ICD-10-CM

## 2021-11-10 DIAGNOSIS — R80.9 TYPE 1 DIABETES MELLITUS WITH MICROALBUMINURIA (HCC): ICD-10-CM

## 2021-11-10 DIAGNOSIS — E10.649 HYPOGLYCEMIA DUE TO TYPE 1 DIABETES MELLITUS (HCC): ICD-10-CM

## 2021-11-10 DIAGNOSIS — Z96.41 INSULIN PUMP IN PLACE: ICD-10-CM

## 2021-11-10 PROBLEM — E66.811 CLASS 1 OBESITY DUE TO EXCESS CALORIES WITH SERIOUS COMORBIDITY AND BODY MASS INDEX (BMI) OF 32.0 TO 32.9 IN ADULT: Status: ACTIVE | Noted: 2021-11-10

## 2021-11-10 LAB
EXPIRATION DATE: ABNORMAL
EXPIRATION DATE: NORMAL
GLUCOSE BLDC GLUCOMTR-MCNC: 206 MG/DL (ref 70–130)
HBA1C MFR BLD: 7 %
Lab: ABNORMAL
Lab: NORMAL

## 2021-11-10 PROCEDURE — 95251 CONT GLUC MNTR ANALYSIS I&R: CPT | Performed by: INTERNAL MEDICINE

## 2021-11-10 PROCEDURE — 83036 HEMOGLOBIN GLYCOSYLATED A1C: CPT | Performed by: INTERNAL MEDICINE

## 2021-11-10 PROCEDURE — 99214 OFFICE O/P EST MOD 30 MIN: CPT | Performed by: INTERNAL MEDICINE

## 2021-11-10 PROCEDURE — 3051F HG A1C>EQUAL 7.0%<8.0%: CPT | Performed by: INTERNAL MEDICINE

## 2021-11-10 RX ORDER — LEVOTHYROXINE SODIUM 0.1 MG/1
100 TABLET ORAL DAILY
Qty: 90 TABLET | Refills: 3 | Status: SHIPPED | OUTPATIENT
Start: 2021-11-10 | End: 2022-10-31

## 2021-11-10 RX ORDER — ATORVASTATIN CALCIUM 20 MG/1
20 TABLET, FILM COATED ORAL DAILY
Qty: 90 TABLET | Refills: 3 | Status: SHIPPED | OUTPATIENT
Start: 2021-11-10 | End: 2022-10-31

## 2021-11-10 RX ORDER — SEMAGLUTIDE 1.34 MG/ML
0.5 INJECTION, SOLUTION SUBCUTANEOUS WEEKLY
Qty: 1.5 ML | Refills: 5 | Status: SHIPPED | OUTPATIENT
Start: 2021-11-10 | End: 2022-02-25

## 2021-11-10 RX ORDER — GABAPENTIN 400 MG/1
CAPSULE ORAL
Qty: 180 CAPSULE | Refills: 5 | Status: SHIPPED | OUTPATIENT
Start: 2021-11-10 | End: 2022-06-03

## 2021-11-10 RX ORDER — FLUTICASONE PROPIONATE 50 MCG
2 SPRAY, SUSPENSION (ML) NASAL AS NEEDED
Qty: 9.9 ML | Refills: 5 | Status: SHIPPED | OUTPATIENT
Start: 2021-11-10

## 2021-11-10 RX ORDER — LOSARTAN POTASSIUM 100 MG/1
100 TABLET ORAL DAILY
Qty: 90 TABLET | Refills: 3 | Status: SHIPPED | OUTPATIENT
Start: 2021-11-10 | End: 2022-04-06 | Stop reason: SDUPTHER

## 2021-11-10 NOTE — ASSESSMENT & PLAN NOTE
Microalbumin improved but above goal last visit.  We discussed new medication that has been approved for type II diabetes with albuminuria.  Will watch to see if this is approved for patients with type I.

## 2021-11-10 NOTE — PROGRESS NOTES
"     Office Note      Date: 11/10/2021  Patient Name: Patricio Holman  MRN: [id number]  : 1975    Chief Complaint   Patient presents with   • Diabetes       History of Present Illness:   Patricio Holman is a 46 y.o. male who presents for Diabetes type 1. Diagnosed in: . Treated in past with insulin. Current treatments: Tandem pump and DexCom G6. Number of insulin shots per day: none - on pump. Checks blood sugar 288 times a day - on DexCom. Has low blood sugar: occasional. Aspirin use: Yes. Statin use: Yes. ACE-I/ARB use: Yes. Changes in health since last visit: PT for frozen shoulder. Last eye exam 2020.     He remains on T4 100mcg qd.  He is taking this correctly.  He isn't taking any interfering meds concurrently.  He denies any sxs of hypo- or hyperthyroidism at this time.    Subjective      Diabetic Complications:  Eyes: Yes - retinopathy  Kidneys: Yes - microalbuminuria  Feet: No  Heart: No    Diet and Exercise:  Meals per day: 3  Minutes of exercise per week: 0 mins.    Review of Systems:   Review of Systems   Constitutional: Positive for fatigue.   Cardiovascular: Negative.    Gastrointestinal: Negative.    Endocrine: Negative.        The following portions of the patient's history were reviewed and updated as appropriate: allergies, current medications, past family history, past medical history, past social history, past surgical history and problem list.    Objective       Visit Vitals  /74 (BP Location: Right arm, Patient Position: Sitting, Cuff Size: Adult)   Pulse 82   Ht 160 cm (63\")   Wt 83 kg (183 lb)   SpO2 98%   BMI 32.42 kg/m²       Physical Exam:  Physical Exam  Constitutional:       Appearance: Normal appearance.   Neurological:      Mental Status: He is alert.         Labs:    HbA1c  Lab Results   Component Value Date    HGBA1C 7.0 11/10/2021       CMP  Lab Results   Component Value Date    GLUCOSE 112 (H) 2021    BUN 14 2021    CREATININE 1.00 2021    " EGFRIFNONA 80 06/28/2021    BCR 14.0 06/28/2021    K 3.6 06/28/2021    CO2 26.4 06/28/2021    CALCIUM 9.4 06/28/2021    AST 23 06/28/2021    ALT 30 06/28/2021        Lipid Panel  Lab Results   Component Value Date    HDL 56 06/28/2021    LDL 81 06/28/2021    TRIG 64 06/28/2021        TSH  Lab Results   Component Value Date    TSH 1.680 06/28/2021        Hemoglobin A1C  Lab Results   Component Value Date    HGBA1C 7.0 11/10/2021        Microalbumin/Creatinine  Lab Results   Component Value Date    MALBCRERATIO 70.4 06/28/2021    MICROALBUR 4.3 06/28/2021           Assessment / Plan      Assessment & Plan:  Diagnoses and all orders for this visit:    1. Uncontrolled type 1 diabetes mellitus with hyperglycemia (HCC) (Primary)  Assessment & Plan:  Diabetes is unchanged.   Continue current treatment regimen.  Diabetes will be reassessed in 3 months.    CGM shows overnight lows.     Orders:  -     POC Glycosylated Hemoglobin (Hb A1C)  -     POC Glucose, Blood  -     gabapentin (NEURONTIN) 400 MG capsule; Take 2 po TID  Dispense: 180 capsule; Refill: 5    2. Hypoglycemia due to type 1 diabetes mellitus (HCC)  Assessment & Plan:  Continue CGM.  Some nocturnal lows.  Decrease overnight basals.      3. Type 1 diabetes mellitus with microalbuminuria (HCC)  Assessment & Plan:  Microalbumin improved but above goal last visit.  We discussed new medication that has been approved for type II diabetes with albuminuria.  Will watch to see if this is approved for patients with type I.      4. Primary hypothyroidism  Assessment & Plan:  TSH okay last visit.  Continue current T4 dose.  Plan to check TSH again next visit.      5. Insulin pump in place    6. Class 1 obesity due to excess calories with serious comorbidity and body mass index (BMI) of 32.0 to 32.9 in adult  Assessment & Plan:  We discussed trial of ozempic to see this helps.        Other orders  -     Semaglutide,0.25 or 0.5MG/DOS, (Ozempic, 0.25 or 0.5 MG/DOSE,) 2 MG/1.5ML  solution pen-injector; Inject 0.5 mg under the skin into the appropriate area as directed 1 (One) Time Per Week.  Dispense: 1.5 mL; Refill: 5  -     atorvastatin (LIPITOR) 20 MG tablet; Take 1 tablet by mouth Daily.  Dispense: 90 tablet; Refill: 3  -     insulin aspart (NovoLOG) 100 UNIT/ML injection; Use as directed in insulin pump.  Max daily dose 100 units  Dispense: 90 mL; Refill: 3  -     levothyroxine (SYNTHROID, LEVOTHROID) 100 MCG tablet; Take 1 tablet by mouth Daily.  Dispense: 90 tablet; Refill: 3  -     losartan (COZAAR) 100 MG tablet; Take 1 tablet by mouth Daily.  Dispense: 90 tablet; Refill: 3  -     fluticasone (FLONASE) 50 MCG/ACT nasal spray; 2 sprays into the nostril(s) as directed by provider As Needed for Rhinitis.  Dispense: 9.9 mL; Refill: 5      Return in about 3 months (around 2/10/2022) for Recheck with A1c, TSH.    Reinaldo Sykes MD   11/10/2021

## 2021-11-10 NOTE — ASSESSMENT & PLAN NOTE
Diabetes is unchanged.   Continue current treatment regimen.  Diabetes will be reassessed in 3 months.    CGM shows overnight lows.

## 2021-11-22 ENCOUNTER — OFFICE VISIT (OUTPATIENT)
Dept: ORTHOPEDIC SURGERY | Facility: CLINIC | Age: 46
End: 2021-11-22

## 2021-11-22 VITALS
HEIGHT: 63 IN | BODY MASS INDEX: 32.42 KG/M2 | WEIGHT: 182.98 LBS | DIASTOLIC BLOOD PRESSURE: 70 MMHG | SYSTOLIC BLOOD PRESSURE: 134 MMHG

## 2021-11-22 DIAGNOSIS — M25.511 RIGHT SHOULDER PAIN, UNSPECIFIED CHRONICITY: Primary | ICD-10-CM

## 2021-11-22 PROCEDURE — 99213 OFFICE O/P EST LOW 20 MIN: CPT | Performed by: ORTHOPAEDIC SURGERY

## 2021-11-22 NOTE — PROGRESS NOTES
"      [facility] [facility]    Subjective     CC: Follow-up (1 month follow up; Right shoulder pain)      CORA Holman is a 46 y.o. male.  He was last seen October 25.  He is doing better.  He goes to the [facility].    Review of Systems   Constitutional: Negative.    HENT: Negative.    Eyes: Negative.    Respiratory: Negative.    Cardiovascular: Negative.    Gastrointestinal: Negative.    Endocrine: Negative.    Genitourinary: Negative.    Musculoskeletal: Positive for arthralgias.   Skin: Negative.    Allergic/Immunologic: Negative.    Neurological: Negative.    Hematological: Negative.    Psychiatric/Behavioral: Negative.        ROS:    Constiutional:Pt denies fever, chills, nausea, or vomiting.  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    Patient is well nourished and well developed.        Ortho Exam  He has improved motion and strength.    Imaging/Labs/EMG Reviewed:  Imaging Results (Last 24 Hours)     ** No results found for the last 24 hours. **          Assessment:  1. Right shoulder pain, unspecified chronicity        Plan:  1. Recommend over the counter anti-inflammatories " for pain and/or swelling  2. He will continue physical therapy.  Follow-up in a month.  Last visit he did not want a cortisone shot  3. He is taking Mobic.    Follow Up:   Return in about 1 month (around 12/22/2021).      Medical Decision Making  Management Options : Low - OT or PT Therapy         Massimo Cueto M.D., [facility]  Orthopedic Surgeon  Fellowship Trained Sports Medicine  [facility]  Orthopedics and Sports Medicine  [address] [place] [address], [address]  [place] [address]    EMR Dragon/Transcription disclaimer:  Much of this encounter note is an electronic transcription of spoken language to printed text. Electronic transcription of spoken language may permit erroneous, or at times, nonsensical words or phrases to be inadvertently transcribed. Although I have reviewed the note for such errors, some may still exist.

## 2021-12-27 ENCOUNTER — OFFICE VISIT (OUTPATIENT)
Dept: ORTHOPEDIC SURGERY | Facility: CLINIC | Age: 46
End: 2021-12-27

## 2021-12-27 VITALS
WEIGHT: 179 LBS | DIASTOLIC BLOOD PRESSURE: 70 MMHG | SYSTOLIC BLOOD PRESSURE: 110 MMHG | HEIGHT: 63 IN | BODY MASS INDEX: 31.71 KG/M2

## 2021-12-27 DIAGNOSIS — M25.511 RIGHT SHOULDER PAIN, UNSPECIFIED CHRONICITY: Primary | ICD-10-CM

## 2021-12-27 PROCEDURE — 99213 OFFICE O/P EST LOW 20 MIN: CPT | Performed by: ORTHOPAEDIC SURGERY

## 2021-12-27 NOTE — PROGRESS NOTES
"      [facility]    Subjective     CC: Follow-up (5 week recheck - Right shoulder pain)      HPI    Patricio Holman is a 46 y.o. male.  He is doing well.  He is happy on his Mobic.  [facility].    Review of Systems   Constitutional: Negative.  Negative for chills, fatigue and fever.   HENT: Positive for congestion. Negative for dental problem.    Eyes: Negative.  Negative for blurred vision.   Respiratory: Negative.  Negative for shortness of breath.    Cardiovascular: Negative.  Negative for leg swelling.   Gastrointestinal: Positive for diarrhea and nausea. Negative for abdominal pain.   Endocrine: Negative.  Negative for polyuria.   Genitourinary: Negative.  Negative for difficulty urinating.   Musculoskeletal: Positive for arthralgias.   Skin: Negative.    Allergic/Immunologic: Negative.    Neurological: Negative.    Hematological: Negative.  Negative for adenopathy.   Psychiatric/Behavioral: Negative.  Negative for behavioral problems.       ROS:    Constiutional:Pt denies fever, chills, nausea, or vomiting.  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    Patient is well nourished and well developed.        Ortho Exam  His right shoulder has full motion full-strength.    Imaging/Labs/EMG Reviewed:  Imaging Results (Last 24 Hours)     ** No results found for the last 24 hours. **          Assessment:  1. Right shoulder pain, unspecified chronicity        Plan:  1. He is doing well.  He will finish out physical therapy.  Continue Mobic.  Follow-up as needed    Follow Up:   Return if symptoms worsen or fail to improve.      Medical Decision Making  Management Options : Low - OTC Drugs and OT or PT Therapy         Massimo Cueto M.D., [facility]  Orthopedic Surgeon  Fellowship Trained Sports Medicine  [facility]  Orthopedics and Sports Medicine  [address], [address]  [place] [address]    EMR Dragon/Transcription disclaimer:  Much of this encounter note is an electronic transcription of spoken language to printed text. Electronic transcription of spoken language may permit erroneous, or at times, nonsensical words or phrases to be inadvertently transcribed. Although I have reviewed the note for such errors, some may still exist.

## 2022-02-25 ENCOUNTER — TELEPHONE (OUTPATIENT)
Dept: ENDOCRINOLOGY | Facility: CLINIC | Age: 47
End: 2022-02-25

## 2022-02-25 RX ORDER — SEMAGLUTIDE 0.5 MG/.5ML
0.5 INJECTION, SOLUTION SUBCUTANEOUS WEEKLY
Qty: 2 ML | Refills: 5 | Status: SHIPPED | OUTPATIENT
Start: 2022-02-25 | End: 2022-04-06 | Stop reason: SDUPTHER

## 2022-02-25 NOTE — TELEPHONE ENCOUNTER
PATIENT STATES THAT HE THINKS HE NEEDS ADJUSTMENTS WITH HIS PUMP AND WOULD LIKE A RETURN CALL. HE IS HAVING SOME HYPOGLYCEMIC EPISODES WITH LOWS IN THE EVENINGS.     CALL BACK [phone]

## 2022-02-28 ENCOUNTER — PRIOR AUTHORIZATION (OUTPATIENT)
Dept: ENDOCRINOLOGY | Facility: CLINIC | Age: 47
End: 2022-02-28

## 2022-03-07 ENCOUNTER — PRIOR AUTHORIZATION (OUTPATIENT)
Dept: ENDOCRINOLOGY | Facility: CLINIC | Age: 47
End: 2022-03-07

## 2022-03-25 ENCOUNTER — DOCUMENTATION (OUTPATIENT)
Dept: ENDOCRINOLOGY | Facility: CLINIC | Age: 47
End: 2022-03-25

## 2022-03-25 NOTE — PROGRESS NOTES
Patient not eligible to fill specialty medication at [facility]. Reason:     Pt has [place] Medicaid and must sign for prescriptions upon delivery which makes coordination difficult    Lor Mark CPhT  Pharmacy Care Coordinator  3/25/2022  15:18 EDT

## 2022-04-06 ENCOUNTER — TELEPHONE (OUTPATIENT)
Dept: ENDOCRINOLOGY | Facility: CLINIC | Age: 47
End: 2022-04-06

## 2022-04-06 ENCOUNTER — OFFICE VISIT (OUTPATIENT)
Dept: ENDOCRINOLOGY | Facility: CLINIC | Age: 47
End: 2022-04-06

## 2022-04-06 VITALS
HEART RATE: 75 BPM | OXYGEN SATURATION: 98 % | BODY MASS INDEX: 31.36 KG/M2 | SYSTOLIC BLOOD PRESSURE: 124 MMHG | WEIGHT: 177 LBS | HEIGHT: 63 IN | DIASTOLIC BLOOD PRESSURE: 72 MMHG

## 2022-04-06 DIAGNOSIS — E66.09 CLASS 1 OBESITY DUE TO EXCESS CALORIES WITH SERIOUS COMORBIDITY AND BODY MASS INDEX (BMI) OF 31.0 TO 31.9 IN ADULT: Chronic | ICD-10-CM

## 2022-04-06 DIAGNOSIS — Z96.41 INSULIN PUMP IN PLACE: ICD-10-CM

## 2022-04-06 DIAGNOSIS — E10.649 HYPOGLYCEMIA DUE TO TYPE 1 DIABETES MELLITUS: ICD-10-CM

## 2022-04-06 DIAGNOSIS — E10.65 UNCONTROLLED TYPE 1 DIABETES MELLITUS WITH HYPERGLYCEMIA: Primary | Chronic | ICD-10-CM

## 2022-04-06 LAB
EXPIRATION DATE: ABNORMAL
EXPIRATION DATE: NORMAL
GLUCOSE BLDC GLUCOMTR-MCNC: 195 MG/DL (ref 70–130)
HBA1C MFR BLD: 7 %
Lab: ABNORMAL
Lab: NORMAL

## 2022-04-06 PROCEDURE — 99214 OFFICE O/P EST MOD 30 MIN: CPT | Performed by: PHYSICIAN ASSISTANT

## 2022-04-06 PROCEDURE — 3051F HG A1C>EQUAL 7.0%<8.0%: CPT | Performed by: PHYSICIAN ASSISTANT

## 2022-04-06 PROCEDURE — 83036 HEMOGLOBIN GLYCOSYLATED A1C: CPT | Performed by: PHYSICIAN ASSISTANT

## 2022-04-06 PROCEDURE — 95251 CONT GLUC MNTR ANALYSIS I&R: CPT | Performed by: PHYSICIAN ASSISTANT

## 2022-04-06 RX ORDER — DIPHENHYDRAMINE HCL 25 MG
25 CAPSULE ORAL NIGHTLY
COMMUNITY

## 2022-04-06 RX ORDER — LOSARTAN POTASSIUM 50 MG/1
50 TABLET ORAL 2 TIMES DAILY
COMMUNITY
Start: 2022-03-09 | End: 2022-12-02

## 2022-04-06 RX ORDER — DULAGLUTIDE 1.5 MG/.5ML
1.5 INJECTION, SOLUTION SUBCUTANEOUS WEEKLY
Qty: 2 ML | Refills: 2 | Status: SHIPPED | OUTPATIENT
Start: 2022-04-06 | End: 2022-07-28 | Stop reason: SDUPTHER

## 2022-04-06 RX ORDER — SEMAGLUTIDE 1.34 MG/ML
0.5 INJECTION, SOLUTION SUBCUTANEOUS WEEKLY
COMMUNITY
Start: 2021-11-28 | End: 2022-04-06

## 2022-04-06 NOTE — TELEPHONE ENCOUNTER
AFTER CHECKOUT, PATIENT WOULD LIKE TO KNOW IF AN RX FOR TRULICITY CAN BE SENT TO HIS PHARMACY TO SEE IF HIS INSURANCE WILL COVER IT. HE WAS PROVIDED A SAMPLE OF TRULICITY DURING HIS APPT TODAY

## 2022-04-06 NOTE — PROGRESS NOTES
"     Office Note      Date: 2022  Patient Name: Patricio Holman  MRN: [id number]  : 1975    Chief Complaint   Patient presents with   • Diabetes       History of Present Illness:   Patricio Holman is a 46 y.o. male who presents today for follow up on type 1 diabetes, diagnosed in .  Known diabetic complications: nephropathy and retinopathy.  He remains on the Tandem X2 insulin pump.  He is using the Dexcom G6 CGM and Control-IQ program.  He reports highly variable blood sugars.  He reports that he is very busy.  He reports some hypoglycemia.  He denies any severe hypoglycemia.  He was started on Ozempic last visit with Dr. Reinaldo Sykes.  He reports blood sugars improved and he has been able to lose weight.  He has noted acid reflux, eructation, and some diarrhea.  He reports this is tolerable though.  Insurance denied the Ozempic since he has type 1 diabetes.  Wegovy was a plan exclusion.  Eye exam current (within one year): yes, 2022, Dr. Tyron Enriquez.  ACE inhibitor/ARB: Yes.  Statin: Yes.      Subjective      Review of Systems:   Review of Systems   Constitutional: Negative.    Cardiovascular: Negative.    Gastrointestinal: Negative.    Endocrine: Negative.      The following portions of the patient's history were reviewed and updated as appropriate: allergies, current medications, past family history, past medical history, past social history, past surgical history and problem list.    Objective     Vitals:    22 0941   BP: 124/72   Pulse: 75   SpO2: 98%   Weight: 80.3 kg (177 lb)   Height: 160 cm (63\")   PainSc: 0-No pain     Body mass index is 31.35 kg/m².    Physical Exam  Vitals reviewed.   Constitutional:       General: He is not in acute distress.  Neurological:      Mental Status: He is alert and oriented to person, place, and time.   Psychiatric:         Mood and Affect: Affect normal.         HEMOGLOBIN A1C  Lab Results   Component Value Date    HGBA1C 7.0 2022    " HGBA1C 7.0 11/10/2021    HGBA1C 7.0 06/28/2021     GLUCOSE  Lab Results   Component Value Date    POCGLU 195 (A) 04/06/2022       Current Outpatient Medications   Medication Instructions   • aspirin 81 mg, Oral, Daily   • atorvastatin (LIPITOR) 20 mg, Oral, Daily   • diphenhydrAMINE (BENADRYL) 25 mg, Oral, Nightly   • fluticasone (FLONASE) 50 MCG/ACT nasal spray 2 sprays, Nasal, As Needed   • gabapentin (NEURONTIN) 400 MG capsule Take 2 po TID   • Glucagon, rDNA, (Glucagon Emergency) 1 MG kit Use as directed for severe hypoglycemia   • insulin aspart (NovoLOG) 100 UNIT/ML injection Use as directed in insulin pump.  Max daily dose 100 units   • levothyroxine (SYNTHROID, LEVOTHROID) 100 mcg, Oral, Daily   • losartan (COZAAR) 50 mg, Oral, 2 Times Daily   • meloxicam (MOBIC) 7.5 MG tablet 1 Oral Daily with food.       Assessment / Plan      Assessment & Plan:  1. Uncontrolled type 1 diabetes mellitus with hyperglycemia (HCC)  A1c okay - stable.  Apparently unable to download his pump.  Reviewed Dexcom data, but difficult to assess and make recommendations without pump/Control-IQ data.  Reviewed CGM data and discussed with patient.  Sensor glucose average 171 for the past 2 weeks, 54% time in range , <1% <70, <1% very low <54, 9% >250.  Encouraged to bolus consistently prior to eating.  Otherwise, he will continue to have postprandial hyperglycemia.  Encouraged to look up carbohydrate content of meals.  Adjust carb ratio from 1:6 to 1:7.  Can adjust further as needed based on pattern when bolusing prior to meals.  Adjust basal rate at night: decreased 9 PM basal, 12 AM basal was set to 0.15 so increased to 0.55.  Recommend to use sleep mode.  Can use exercise mode for increased activity.  - POC Glucose, Blood  - POC Glycosylated Hemoglobin (Hb A1C)    2. Hypoglycemia due to type 1 diabetes mellitus (HCC)  Adjusted pump.  Continue CGM.    3. Class 1 obesity due to excess calories with serious comorbidity and body  mass index (BMI) of 31.0 to 31.9 in adult  He would like to continue GLP-1 RA, not covered by insurance.  He reports finding a program that he thinks he can get Trulicity for reasonable cost.  Sample of Trulicity provided to start once he finishes up Ozempic.  Encouraged nutritious dietary choices, portion control and regular exercise.    4. Insulin pump in place      Return in about 3 months (around 7/6/2022) for next scheduled follow up. He was advised to contact the office with any interval questions or concerns.    GERMÁN Figueroa  Endocrinology  04/06/2022

## 2022-04-07 NOTE — TELEPHONE ENCOUNTER
He told me that his insurance would not cover the Ozempic because he has type 1 diabetes.  Really doubt the Trulicity will be covered either, but will send in Rx to see.

## 2022-04-08 RX ORDER — MELOXICAM 7.5 MG/1
TABLET ORAL
Qty: 30 TABLET | Refills: 2 | Status: SHIPPED | OUTPATIENT
Start: 2022-04-08 | End: 2022-07-11 | Stop reason: SDUPTHER

## 2022-05-04 ENCOUNTER — TELEPHONE (OUTPATIENT)
Dept: ENDOCRINOLOGY | Facility: CLINIC | Age: 47
End: 2022-05-04

## 2022-05-04 RX ORDER — BLOOD SUGAR DIAGNOSTIC
STRIP MISCELLANEOUS
Qty: 100 EACH | Refills: 3 | Status: SHIPPED | OUTPATIENT
Start: 2022-05-04

## 2022-05-04 RX ORDER — PERPHENAZINE 16 MG/1
TABLET, FILM COATED ORAL
Qty: 100 EACH | Refills: 3 | Status: SHIPPED | OUTPATIENT
Start: 2022-05-04

## 2022-05-04 NOTE — TELEPHONE ENCOUNTER
PLEASE CALL THESE IN ASAP  ONE TOUCH ULTRA TEST STRIPS AND DHARA  CONTOUR TEST STRIPS.  HE WANTED TO SEE WHICH ONE IS BETTER  PLEASE CALL TO  [facility]    PTS NUMBER IF YOU NEED IT  [phone]

## 2022-06-03 DIAGNOSIS — E10.65 UNCONTROLLED TYPE 1 DIABETES MELLITUS WITH HYPERGLYCEMIA: ICD-10-CM

## 2022-06-03 RX ORDER — GABAPENTIN 400 MG/1
CAPSULE ORAL
Qty: 180 CAPSULE | Refills: 5 | Status: SHIPPED | OUTPATIENT
Start: 2022-06-03 | End: 2022-12-02

## 2022-06-10 ENCOUNTER — TELEPHONE (OUTPATIENT)
Dept: ENDOCRINOLOGY | Facility: CLINIC | Age: 47
End: 2022-06-10

## 2022-06-22 ENCOUNTER — TELEPHONE (OUTPATIENT)
Dept: ENDOCRINOLOGY | Facility: CLINIC | Age: 47
End: 2022-06-22

## 2022-06-22 NOTE — TELEPHONE ENCOUNTER
Patient called in and need documentation about his inulin pump so that he can go to a concert this weekend.. He would like a call back.

## 2022-06-30 RX ORDER — MELOXICAM 7.5 MG/1
TABLET ORAL
Qty: 30 TABLET | Refills: 12 | OUTPATIENT
Start: 2022-06-30

## 2022-07-05 RX ORDER — MELOXICAM 7.5 MG/1
TABLET ORAL
Qty: 30 TABLET | Refills: 2 | OUTPATIENT
Start: 2022-07-05

## 2022-07-07 ENCOUNTER — TELEPHONE (OUTPATIENT)
Dept: ORTHOPEDIC SURGERY | Facility: CLINIC | Age: 47
End: 2022-07-07

## 2022-07-07 RX ORDER — MELOXICAM 7.5 MG/1
TABLET ORAL
Qty: 30 TABLET | Refills: 2 | OUTPATIENT
Start: 2022-07-07

## 2022-07-07 NOTE — TELEPHONE ENCOUNTER
Dr. Cueto,    Last refill request [facility] deferred refill to PCP due to lab monitoring.  When I spoke to patient, he says you have discussed this and since he has lab work every year you will provide refills on the Meloxicam.  Please advise if we will provide Meloxicam refills.    Thank you,    Dong HARRIS(R)

## 2022-07-07 NOTE — TELEPHONE ENCOUNTER
Caller: PATIENT       Relationship: SELF     Best call back number: [phone]      Requested Prescriptions: MELOXICAM 7.5 MG.        Pharmacy where request should be sent:  [facility]- [phone]    Additional details provided by patient: PT. STATES THAT HIS PHARMACY REQUESTED A REFILL ON MELOXICAM LAST WEEK, BUT HAVE NEVER HEARD BACK.  PT. ASKING IF OFFICE WILL CALL TO LET HIM KNOW WHEN THIS HAS BEEN SENT TO PHARMACY.    Does the patient have less than a 3 day supply:  [x] Yes  [] No

## 2022-07-11 RX ORDER — MELOXICAM 7.5 MG/1
TABLET ORAL
Qty: 30 TABLET | Refills: 2 | Status: SHIPPED | OUTPATIENT
Start: 2022-07-11

## 2022-07-11 NOTE — TELEPHONE ENCOUNTER
Tried to call patient, no answer or voicemail.  When he returns call, please let him know Dr. Cueto sent in a refill for the Meloxicam along with 2 additional refills.    Thank you,    Dong HARRIS(R)

## 2022-07-11 NOTE — TELEPHONE ENCOUNTER
PATIENT RETURNED MISSED CALL. I INFORMED THE PATIENT DR WILSON CALLED IN REFILLS OF MELOXICAM. PATIENT UNDERSTOOD.

## 2022-07-27 ENCOUNTER — OFFICE VISIT (OUTPATIENT)
Dept: ENDOCRINOLOGY | Facility: CLINIC | Age: 47
End: 2022-07-27

## 2022-07-27 VITALS
OXYGEN SATURATION: 97 % | HEIGHT: 63 IN | DIASTOLIC BLOOD PRESSURE: 64 MMHG | BODY MASS INDEX: 30.37 KG/M2 | HEART RATE: 75 BPM | WEIGHT: 171.4 LBS | SYSTOLIC BLOOD PRESSURE: 118 MMHG

## 2022-07-27 DIAGNOSIS — E10.649 HYPOGLYCEMIA DUE TO TYPE 1 DIABETES MELLITUS: ICD-10-CM

## 2022-07-27 DIAGNOSIS — E10.65 TYPE 1 DIABETES MELLITUS WITH HYPERGLYCEMIA: Primary | Chronic | ICD-10-CM

## 2022-07-27 DIAGNOSIS — Z96.41 PRESENCE OF INSULIN PUMP: ICD-10-CM

## 2022-07-27 DIAGNOSIS — E66.09 CLASS 1 OBESITY DUE TO EXCESS CALORIES WITH SERIOUS COMORBIDITY AND BODY MASS INDEX (BMI) OF 30.0 TO 30.9 IN ADULT: Chronic | ICD-10-CM

## 2022-07-27 PROBLEM — E66.811 CLASS 1 OBESITY DUE TO EXCESS CALORIES WITH SERIOUS COMORBIDITY AND BODY MASS INDEX (BMI) OF 30.0 TO 30.9 IN ADULT: Chronic | Status: ACTIVE | Noted: 2021-11-10

## 2022-07-27 LAB
EXPIRATION DATE: ABNORMAL
EXPIRATION DATE: NORMAL
GLUCOSE BLDC GLUCOMTR-MCNC: 153 MG/DL (ref 70–130)
HBA1C MFR BLD: 6.8 %
Lab: ABNORMAL
Lab: NORMAL

## 2022-07-27 PROCEDURE — 99214 OFFICE O/P EST MOD 30 MIN: CPT | Performed by: PHYSICIAN ASSISTANT

## 2022-07-27 PROCEDURE — 83036 HEMOGLOBIN GLYCOSYLATED A1C: CPT | Performed by: PHYSICIAN ASSISTANT

## 2022-07-27 PROCEDURE — 95251 CONT GLUC MNTR ANALYSIS I&R: CPT | Performed by: PHYSICIAN ASSISTANT

## 2022-07-27 PROCEDURE — 3044F HG A1C LEVEL LT 7.0%: CPT | Performed by: PHYSICIAN ASSISTANT

## 2022-07-27 RX ORDER — SEMAGLUTIDE 1.34 MG/ML
0.5 INJECTION, SOLUTION SUBCUTANEOUS WEEKLY
COMMUNITY
End: 2022-07-27

## 2022-07-28 RX ORDER — DULAGLUTIDE 1.5 MG/.5ML
1.5 INJECTION, SOLUTION SUBCUTANEOUS WEEKLY
Qty: 2 ML | Refills: 2 | Status: SHIPPED | OUTPATIENT
Start: 2022-07-28 | End: 2022-08-02 | Stop reason: SDUPTHER

## 2022-07-28 NOTE — TELEPHONE ENCOUNTER
This prescription need to be sent to [facility] in [place].the presciption need to be sign and faxed back

## 2022-08-02 NOTE — TELEPHONE ENCOUNTER
Patricia Cares asking for prescription to be faxed to [phone] for [facility] for patient assistance.

## 2022-08-03 RX ORDER — DULAGLUTIDE 1.5 MG/.5ML
1.5 INJECTION, SOLUTION SUBCUTANEOUS WEEKLY
Qty: 6 ML | Refills: 3 | Status: SHIPPED | OUTPATIENT
Start: 2022-08-03

## 2022-10-03 RX ORDER — MELOXICAM 7.5 MG/1
TABLET ORAL
Qty: 30 TABLET | Refills: 2 | OUTPATIENT
Start: 2022-10-03

## 2022-10-03 NOTE — TELEPHONE ENCOUNTER
Not seen since December 2021--since patient appears to be on this medication long-term/chronically then further refills will have to be prescribed by PCP so that appropriate kidney/GI monitoring can be completed.

## 2022-10-31 RX ORDER — LEVOTHYROXINE SODIUM 0.1 MG/1
100 TABLET ORAL DAILY
Qty: 30 TABLET | Refills: 1 | Status: SHIPPED | OUTPATIENT
Start: 2022-10-31 | End: 2023-01-04

## 2022-10-31 RX ORDER — ATORVASTATIN CALCIUM 20 MG/1
20 TABLET, FILM COATED ORAL DAILY
Qty: 30 TABLET | Refills: 1 | Status: SHIPPED | OUTPATIENT
Start: 2022-10-31 | End: 2023-01-04

## 2022-11-23 ENCOUNTER — TELEPHONE (OUTPATIENT)
Dept: ENDOCRINOLOGY | Facility: CLINIC | Age: 47
End: 2022-11-23

## 2022-12-02 DIAGNOSIS — E10.65 UNCONTROLLED TYPE 1 DIABETES MELLITUS WITH HYPERGLYCEMIA: ICD-10-CM

## 2022-12-02 RX ORDER — LOSARTAN POTASSIUM 100 MG/1
TABLET ORAL
Qty: 90 TABLET | Refills: 1 | Status: SHIPPED | OUTPATIENT
Start: 2022-12-02

## 2022-12-02 RX ORDER — GABAPENTIN 400 MG/1
CAPSULE ORAL
Qty: 180 CAPSULE | Refills: 5 | Status: SHIPPED | OUTPATIENT
Start: 2022-12-02

## 2022-12-29 ENCOUNTER — LAB (OUTPATIENT)
Dept: LAB | Facility: HOSPITAL | Age: 47
End: 2022-12-29
Payer: COMMERCIAL

## 2022-12-29 ENCOUNTER — OFFICE VISIT (OUTPATIENT)
Dept: ENDOCRINOLOGY | Facility: CLINIC | Age: 47
End: 2022-12-29

## 2022-12-29 VITALS
OXYGEN SATURATION: 97 % | BODY MASS INDEX: 31.71 KG/M2 | SYSTOLIC BLOOD PRESSURE: 114 MMHG | WEIGHT: 179 LBS | HEIGHT: 63 IN | DIASTOLIC BLOOD PRESSURE: 73 MMHG | HEART RATE: 83 BPM

## 2022-12-29 DIAGNOSIS — Z96.41 INSULIN PUMP IN PLACE: ICD-10-CM

## 2022-12-29 DIAGNOSIS — E10.65 UNCONTROLLED TYPE 1 DIABETES MELLITUS WITH HYPERGLYCEMIA: ICD-10-CM

## 2022-12-29 DIAGNOSIS — E10.319 TYPE 1 DIABETES MELLITUS WITH RETINOPATHY, MACULAR EDEMA PRESENCE UNSPECIFIED, UNSPECIFIED LATERALITY, UNSPECIFIED RETINOPATHY SEVERITY: ICD-10-CM

## 2022-12-29 DIAGNOSIS — E10.29 TYPE 1 DIABETES MELLITUS WITH MICROALBUMINURIA: ICD-10-CM

## 2022-12-29 DIAGNOSIS — E03.9 PRIMARY HYPOTHYROIDISM: ICD-10-CM

## 2022-12-29 DIAGNOSIS — R80.9 TYPE 1 DIABETES MELLITUS WITH MICROALBUMINURIA: ICD-10-CM

## 2022-12-29 DIAGNOSIS — E10.649 HYPOGLYCEMIA DUE TO TYPE 1 DIABETES MELLITUS: ICD-10-CM

## 2022-12-29 DIAGNOSIS — E10.65 UNCONTROLLED TYPE 1 DIABETES MELLITUS WITH HYPERGLYCEMIA: Primary | ICD-10-CM

## 2022-12-29 LAB
ALBUMIN SERPL-MCNC: 4.4 G/DL (ref 3.5–5.2)
ALBUMIN UR-MCNC: 3.7 MG/DL
ALBUMIN/GLOB SERPL: 1.5 G/DL
ALP SERPL-CCNC: 112 U/L (ref 39–117)
ALT SERPL W P-5'-P-CCNC: 27 U/L (ref 1–41)
ANION GAP SERPL CALCULATED.3IONS-SCNC: 12 MMOL/L (ref 5–15)
AST SERPL-CCNC: 29 U/L (ref 1–40)
BILIRUB SERPL-MCNC: 0.4 MG/DL (ref 0–1.2)
BUN SERPL-MCNC: 12 MG/DL (ref 6–20)
BUN/CREAT SERPL: 11.4 (ref 7–25)
CALCIUM SPEC-SCNC: 9.4 MG/DL (ref 8.6–10.5)
CHLORIDE SERPL-SCNC: 101 MMOL/L (ref 98–107)
CHOLEST SERPL-MCNC: 147 MG/DL (ref 0–200)
CO2 SERPL-SCNC: 25 MMOL/L (ref 22–29)
CREAT SERPL-MCNC: 1.05 MG/DL (ref 0.76–1.27)
CREAT UR-MCNC: 92.6 MG/DL
EGFRCR SERPLBLD CKD-EPI 2021: 87.6 ML/MIN/1.73
EXPIRATION DATE: NORMAL
EXPIRATION DATE: NORMAL
GLOBULIN UR ELPH-MCNC: 2.9 GM/DL
GLUCOSE BLDC GLUCOMTR-MCNC: 113 MG/DL (ref 70–130)
GLUCOSE SERPL-MCNC: 124 MG/DL (ref 65–99)
HBA1C MFR BLD: 7.2 %
HDLC SERPL-MCNC: 66 MG/DL (ref 40–60)
LDLC SERPL CALC-MCNC: 70 MG/DL (ref 0–100)
LDLC/HDLC SERPL: 1.08 {RATIO}
Lab: NORMAL
Lab: NORMAL
MICROALBUMIN/CREAT UR: 40 MG/G
POTASSIUM SERPL-SCNC: 3.5 MMOL/L (ref 3.5–5.2)
PROT SERPL-MCNC: 7.3 G/DL (ref 6–8.5)
SODIUM SERPL-SCNC: 138 MMOL/L (ref 136–145)
TRIGL SERPL-MCNC: 49 MG/DL (ref 0–150)
TSH SERPL DL<=0.05 MIU/L-ACNC: 1.52 UIU/ML (ref 0.27–4.2)
VLDLC SERPL-MCNC: 11 MG/DL (ref 5–40)

## 2022-12-29 PROCEDURE — 99214 OFFICE O/P EST MOD 30 MIN: CPT | Performed by: INTERNAL MEDICINE

## 2022-12-29 PROCEDURE — 95251 CONT GLUC MNTR ANALYSIS I&R: CPT | Performed by: INTERNAL MEDICINE

## 2022-12-29 PROCEDURE — 82043 UR ALBUMIN QUANTITATIVE: CPT

## 2022-12-29 PROCEDURE — 83036 HEMOGLOBIN GLYCOSYLATED A1C: CPT | Performed by: INTERNAL MEDICINE

## 2022-12-29 PROCEDURE — 80053 COMPREHEN METABOLIC PANEL: CPT

## 2022-12-29 PROCEDURE — 3051F HG A1C>EQUAL 7.0%<8.0%: CPT | Performed by: INTERNAL MEDICINE

## 2022-12-29 PROCEDURE — 80061 LIPID PANEL: CPT

## 2022-12-29 PROCEDURE — 82570 ASSAY OF URINE CREATININE: CPT

## 2022-12-29 PROCEDURE — 84443 ASSAY THYROID STIM HORMONE: CPT

## 2022-12-29 NOTE — PROGRESS NOTES
"     Office Note      Date: 2022  Patient Name: Patricio Holman  MRN: [id number]  : 1975    Chief Complaint   Patient presents with   • Diabetes       History of Present Illness:   Patricio Holman is a 47 y.o. male who presents for Diabetes type 1. Diagnosed in: . Treated in past with insulin. Current treatments: Trulicity, Tandem pump and DexCom G6. Number of insulin shots per day: none - on pump. Checks blood sugar 288 times a day - on DexCom. Has low blood sugar: occasional. Aspirin use: Yes. Statin use: Yes. ACE-I/ARB use: Yes. Changes in health since last visit: none. Last eye exam 2022.     He remains on T4 100mcg qd.  He is taking this correctly.  He isn't taking any interfering meds concurrently.  He denies any sxs of hypo- or hyperthyroidism at this time.    Subjective      Diabetic Complications:  Eyes: Yes - retinopathy  Kidneys: Yes - microalbuminuria  Feet: No  Heart: No    Diet and Exercise:  Meals per day: 3  Minutes of exercise per week: 0 mins.    Review of Systems:   Review of Systems   Constitutional: Negative.    Cardiovascular: Negative.    Gastrointestinal: Negative.    Endocrine: Negative.        The following portions of the patient's history were reviewed and updated as appropriate: allergies, current medications, past family history, past medical history, past social history, past surgical history and problem list.    Objective       Visit Vitals  /73   Pulse 83   Ht 160 cm (63\")   Wt 81.2 kg (179 lb)   SpO2 97%   BMI 31.71 kg/m²       Physical Exam:  Physical Exam  Constitutional:       Appearance: Normal appearance.   Cardiovascular:      Pulses:           Dorsalis pedis pulses are 2+ on the right side and 2+ on the left side.        Posterior tibial pulses are 2+ on the right side and 2+ on the left side.   Musculoskeletal:      Left foot: Deformity present.   Feet:      Right foot:      Protective Sensation: 5 sites tested. 5 sites sensed.      Skin " integrity: Skin integrity normal.      Toenail Condition: Right toenails are normal.      Left foot:      Protective Sensation: 5 sites tested. 5 sites sensed.      Skin integrity: Skin integrity normal.      Toenail Condition: Left toenails are normal.      Comments: Club foot left  Neurological:      Mental Status: He is alert.         Labs:    HbA1c  Lab Results   Component Value Date    HGBA1C 7.2 12/29/2022       CMP  Lab Results   Component Value Date    GLUCOSE 112 (H) 06/28/2021    BUN 14 06/28/2021    CREATININE 1.00 06/28/2021    EGFRIFNONA 80 06/28/2021    BCR 14.0 06/28/2021    K 3.6 06/28/2021    CO2 26.4 06/28/2021    CALCIUM 9.4 06/28/2021    AST 23 06/28/2021    ALT 30 06/28/2021        Lipid Panel  Lab Results   Component Value Date    HDL 56 06/28/2021    LDL 81 06/28/2021    TRIG 64 06/28/2021        TSH  Lab Results   Component Value Date    TSH 1.680 06/28/2021        Hemoglobin A1C  Lab Results   Component Value Date    HGBA1C 7.2 12/29/2022        Microalbumin/Creatinine  Lab Results   Component Value Date    MALBCRERATIO 70.4 06/28/2021    MICROALBUR 4.3 06/28/2021           Assessment / Plan      Assessment & Plan:  Diagnoses and all orders for this visit:    1. Uncontrolled type 1 diabetes mellitus with hyperglycemia (HCC) (Primary)  Assessment & Plan:  Diabetes is worsening.  A1c just above goal.  CGM shows occ nocturnal hypoglycemia and mild postprandial spikes.  Continue current treatment regimen.  But decrease overnight basal rate.  Be more aggressive with boluses.  Diabetes will be reassessed in 3 months.    Orders:  -     POC Glucose, Blood  -     POC Glycosylated Hemoglobin (Hb A1C)  -     Comprehensive Metabolic Panel; Future  -     Lipid Panel; Future  -     Microalbumin / Creatinine Urine Ratio - Urine, Clean Catch; Future  -     TSH; Future    2. Hypoglycemia due to type 1 diabetes mellitus (HCC)  Assessment & Plan:  Continue CGM.  Decrease overnight basal.      3. Type 1  diabetes mellitus with retinopathy, macular edema presence unspecified, unspecified laterality, unspecified retinopathy severity (HCC)  Assessment & Plan:  Continue ophthalmology follow up.      4. Type 1 diabetes mellitus with microalbuminuria (HCC)  Assessment & Plan:  Continue ARB.  Check microalbumin today.      5. Primary hypothyroidism  Assessment & Plan:  Continue T4 tx.  Check TSH today.      6. Insulin pump in place    Current Outpatient Medications   Medication Instructions   • aspirin 81 mg, Oral, Daily   • atorvastatin (LIPITOR) 20 mg, Oral, Daily   • diphenhydrAMINE (BENADRYL) 25 mg, Oral, Nightly   • fluticasone (FLONASE) 50 MCG/ACT nasal spray 2 sprays, Nasal, As Needed   • gabapentin (NEURONTIN) 400 MG capsule TAKE 2 CAPSULES THREE TIMES A DAY   • Glucagon, rDNA, (Glucagon Emergency) 1 MG kit Use as directed for severe hypoglycemia   • glucose blood (Contour Next Test) test strip Test 2 times daily   • glucose blood (OneTouch Ultra) test strip Test 2 times daily   • insulin aspart (NovoLOG) 100 UNIT/ML injection Use as directed in insulin pump.  Max daily dose 100 units   • levothyroxine (SYNTHROID, LEVOTHROID) 100 mcg, Oral, Daily   • losartan (COZAAR) 100 MG tablet TAKE ONE TABLET DAILY   • meloxicam (MOBIC) 7.5 MG tablet TAKE ONE TABLET BY MOUTH DAILY WITH FOOD   • Trulicity 1.5 mg, Subcutaneous, Weekly      Return in about 3 months (around 3/29/2023) for Recheck with A1c, TSH.    Reinaldo Sykes MD   12/29/2022

## 2023-01-04 RX ORDER — INSULIN ASPART 100 [IU]/ML
INJECTION, SOLUTION INTRAVENOUS; SUBCUTANEOUS
Qty: 90 ML | Refills: 3 | Status: SHIPPED | OUTPATIENT
Start: 2023-01-04

## 2023-01-04 RX ORDER — LEVOTHYROXINE SODIUM 0.1 MG/1
100 TABLET ORAL DAILY
Qty: 30 TABLET | Refills: 2 | Status: SHIPPED | OUTPATIENT
Start: 2023-01-04

## 2023-01-04 RX ORDER — ATORVASTATIN CALCIUM 20 MG/1
20 TABLET, FILM COATED ORAL DAILY
Qty: 30 TABLET | Refills: 3 | Status: SHIPPED | OUTPATIENT
Start: 2023-01-04

## 2023-02-13 ENCOUNTER — TELEPHONE (OUTPATIENT)
Dept: ENDOCRINOLOGY | Facility: CLINIC | Age: 48
End: 2023-02-13
Payer: COMMERCIAL

## 2023-02-13 NOTE — TELEPHONE ENCOUNTER
Spoke with patient.  He states that he is now not eating breakfast until later, almost like a brunch.  He states that his blood sugar is low in the morning.  He wants to know if settings can be changed so he doesn't get lows in the morning.  He states if he decides to eat earlier he will give more insulin.

## 2023-02-13 NOTE — TELEPHONE ENCOUNTER
Patient called wanting to speak with Dr. Sykes about turning down his insulin pump. Please advise.

## 2023-03-31 ENCOUNTER — OFFICE VISIT (OUTPATIENT)
Dept: ENDOCRINOLOGY | Facility: CLINIC | Age: 48
End: 2023-03-31
Payer: COMMERCIAL

## 2023-03-31 VITALS
HEART RATE: 77 BPM | DIASTOLIC BLOOD PRESSURE: 68 MMHG | OXYGEN SATURATION: 97 % | SYSTOLIC BLOOD PRESSURE: 120 MMHG | HEIGHT: 63 IN | WEIGHT: 176.6 LBS | BODY MASS INDEX: 31.29 KG/M2

## 2023-03-31 DIAGNOSIS — E10.319 TYPE 1 DIABETES MELLITUS WITH RETINOPATHY, MACULAR EDEMA PRESENCE UNSPECIFIED, UNSPECIFIED LATERALITY, UNSPECIFIED RETINOPATHY SEVERITY: ICD-10-CM

## 2023-03-31 DIAGNOSIS — E10.649 HYPOGLYCEMIA DUE TO TYPE 1 DIABETES MELLITUS: ICD-10-CM

## 2023-03-31 DIAGNOSIS — E10.29 TYPE 1 DIABETES MELLITUS WITH MICROALBUMINURIA: ICD-10-CM

## 2023-03-31 DIAGNOSIS — E10.65 UNCONTROLLED TYPE 1 DIABETES MELLITUS WITH HYPERGLYCEMIA: Primary | ICD-10-CM

## 2023-03-31 DIAGNOSIS — Z96.41 INSULIN PUMP IN PLACE: ICD-10-CM

## 2023-03-31 DIAGNOSIS — R80.9 TYPE 1 DIABETES MELLITUS WITH MICROALBUMINURIA: ICD-10-CM

## 2023-03-31 DIAGNOSIS — E03.9 PRIMARY HYPOTHYROIDISM: ICD-10-CM

## 2023-03-31 LAB
EXPIRATION DATE: ABNORMAL
EXPIRATION DATE: NORMAL
GLUCOSE BLDC GLUCOMTR-MCNC: 280 MG/DL (ref 70–130)
HBA1C MFR BLD: 7.1 %
Lab: ABNORMAL
Lab: NORMAL

## 2023-03-31 PROCEDURE — 83036 HEMOGLOBIN GLYCOSYLATED A1C: CPT | Performed by: INTERNAL MEDICINE

## 2023-03-31 PROCEDURE — 1159F MED LIST DOCD IN RCRD: CPT | Performed by: INTERNAL MEDICINE

## 2023-03-31 PROCEDURE — 1160F RVW MEDS BY RX/DR IN RCRD: CPT | Performed by: INTERNAL MEDICINE

## 2023-03-31 PROCEDURE — 95251 CONT GLUC MNTR ANALYSIS I&R: CPT | Performed by: INTERNAL MEDICINE

## 2023-03-31 PROCEDURE — 99214 OFFICE O/P EST MOD 30 MIN: CPT | Performed by: INTERNAL MEDICINE

## 2023-03-31 PROCEDURE — 3051F HG A1C>EQUAL 7.0%<8.0%: CPT | Performed by: INTERNAL MEDICINE

## 2023-03-31 RX ORDER — ALPRAZOLAM 0.5 MG/1
0.5 TABLET ORAL AS NEEDED
COMMUNITY
Start: 2023-03-07

## 2023-03-31 RX ORDER — BUSPIRONE HYDROCHLORIDE 15 MG/1
15 TABLET ORAL DAILY
COMMUNITY
Start: 2023-02-21

## 2023-03-31 NOTE — PROGRESS NOTES
"     Office Note      Date: 2023  Patient Name: Patricio Holman  MRN: [id number]  : 1975    Chief Complaint   Patient presents with   • Diabetes       History of Present Illness:   Patricio Holman is a 47 y.o. male who presents for Diabetes type 1. Diagnosed in: . Treated in past with insulin. Current treatments: Trulicity, Tandem pump and DexCom G6. Number of insulin shots per day: none - on pump. Checks blood sugar 288 times a day - on DexCom. Has low blood sugar: occasional. Aspirin use: Yes. Statin use: Yes. ACE-I/ARB use: Yes. Changes in health since last visit: none. Last eye exam 2022.     He remains on T4 100mcg qd.  He is taking this correctly.  He isn't taking any interfering meds concurrently.  He denies any sxs of hypo- or hyperthyroidism at this time.    Subjective      Diabetic Complications:  Eyes: Yes - retinopathy  Kidneys: Yes - microalbuminuria  Feet: No  Heart: No    Diet and Exercise:  Meals per day: 3  Minutes of exercise per week: 0 mins.    Review of Systems:   Review of Systems   Constitutional: Negative.    Cardiovascular: Negative.    Gastrointestinal: Negative.    Endocrine: Negative.        The following portions of the patient's history were reviewed and updated as appropriate: allergies, current medications, past family history, past medical history, past social history, past surgical history and problem list.    Objective       Visit Vitals  /68   Pulse 77   Ht 160 cm (63\")   Wt 80.1 kg (176 lb 9.6 oz)   SpO2 97%   BMI 31.28 kg/m²       Physical Exam:  Physical Exam  Constitutional:       Appearance: Normal appearance.   Neurological:      Mental Status: He is alert.         Labs:    HbA1c  Lab Results   Component Value Date    HGBA1C 7.1 2023       CMP  Lab Results   Component Value Date    GLUCOSE 124 (H) 2022    BUN 12 2022    CREATININE 1.05 2022    EGFRIFNONA 80 2021    BCR 11.4 2022    K 3.5 2022    CO2 25.0 " 12/29/2022    CALCIUM 9.4 12/29/2022    AST 29 12/29/2022    ALT 27 12/29/2022        Lipid Panel  Lab Results   Component Value Date    HDL 66 (H) 12/29/2022    LDL 70 12/29/2022    TRIG 49 12/29/2022        TSH  Lab Results   Component Value Date    TSH 1.520 12/29/2022        Hemoglobin A1C  Lab Results   Component Value Date    HGBA1C 7.1 03/31/2023        Microalbumin/Creatinine  Lab Results   Component Value Date    MALBCRERATIO 40.0 12/29/2022    MICROALBUR 3.7 12/29/2022           Assessment / Plan      Assessment & Plan:  Diagnoses and all orders for this visit:    1. Uncontrolled type 1 diabetes mellitus with hyperglycemia (HCC) (Primary)  Assessment & Plan:  Diabetes is unchanged.   Continue current treatment regimen.  Diabetes will be reassessed in 3 months.    DexCom G6 CGM was downloaded today.  Data was reviewed from 3/10/23 to 3/30/23.  This showed rise in the late evening and then slow decrease overnight.  Readings during the day are a bit better.  Will adjust pump settings today to accomodate this.    Orders:  -     POC Glucose, Blood  -     POC Glycosylated Hemoglobin (Hb A1C)    2. Hypoglycemia due to type 1 diabetes mellitus (HCC)  Assessment & Plan:  Continue CGM.  He notes some lows around 9 to 10PM.      3. Type 1 diabetes mellitus with retinopathy, macular edema presence unspecified, unspecified laterality, unspecified retinopathy severity (HCC)  Assessment & Plan:  Continue ophthalmology follow up.      4. Type 1 diabetes mellitus with microalbuminuria (HCC)  Assessment & Plan:  Continue ARB.      5. Primary hypothyroidism  Assessment & Plan:  Continue T4.  TSH at goal last visit.      6. Insulin pump in place    Current Outpatient Medications   Medication Instructions   • ALPRAZolam (XANAX) 0.5 mg, Oral, As Needed, Prn for eye injection   • aspirin 81 mg, Oral, Daily   • atorvastatin (LIPITOR) 20 mg, Oral, Daily   • busPIRone (BUSPAR) 15 mg, Oral, Daily   • diphenhydrAMINE (BENADRYL) 25  mg, Oral, Nightly   • fluticasone (FLONASE) 50 MCG/ACT nasal spray 2 sprays, Nasal, As Needed   • gabapentin (NEURONTIN) 400 MG capsule TAKE 2 CAPSULES THREE TIMES A DAY   • Glucagon, rDNA, (Glucagon Emergency) 1 MG kit Use as directed for severe hypoglycemia   • glucose blood (Contour Next Test) test strip Test 2 times daily   • glucose blood (OneTouch Ultra) test strip Test 2 times daily   • levothyroxine (SYNTHROID, LEVOTHROID) 100 mcg, Oral, Daily   • losartan (COZAAR) 100 MG tablet TAKE ONE TABLET DAILY   • meloxicam (MOBIC) 7.5 MG tablet TAKE ONE TABLET BY MOUTH DAILY WITH FOOD   • NovoLOG 100 UNIT/ML injection USE AS DIRECTED IN INSULIN PUMP. MAX DAILY DOSE 100 UNITS   • Trulicity 1.5 mg, Subcutaneous, Weekly      Return in about 3 months (around 6/30/2023) for Recheck with A1c, TSH.    Reinaldo Sykes MD   03/31/2023

## 2023-03-31 NOTE — ASSESSMENT & PLAN NOTE
Diabetes is unchanged.   Continue current treatment regimen.  Diabetes will be reassessed in 3 months.    DexCom G6 CGM was downloaded today.  Data was reviewed from 3/10/23 to 3/30/23.  This showed rise in the late evening and then slow decrease overnight.  Readings during the day are a bit better.  Will adjust pump settings today to accomodate this.

## 2023-04-17 RX ORDER — LEVOTHYROXINE SODIUM 0.1 MG/1
100 TABLET ORAL DAILY
Qty: 30 TABLET | Refills: 3 | Status: SHIPPED | OUTPATIENT
Start: 2023-04-17

## 2023-05-23 RX ORDER — ATORVASTATIN CALCIUM 20 MG/1
TABLET, FILM COATED ORAL
Qty: 30 TABLET | Refills: 3 | Status: SHIPPED | OUTPATIENT
Start: 2023-05-23

## 2023-05-23 NOTE — TELEPHONE ENCOUNTER
Rx Refill Note  Requested Prescriptions     Pending Prescriptions Disp Refills   • atorvastatin (LIPITOR) 20 MG tablet [Pharmacy Med Name: ATORVASTATIN 20 MG TABLET 20 Tablet] 30 tablet 3     Sig: TAKE ONE TABLET BY MOUTH EVERY DAY      Last office visit with prescribing clinician: 3/31/2023   Last telemedicine visit with prescribing clinician: Visit date not found   Next office visit with prescribing clinician: 7/25/2023                           Africa Gaspar CMA  05/23/23, 08:55 EDT

## 2023-06-01 ENCOUNTER — TELEPHONE (OUTPATIENT)
Dept: ENDOCRINOLOGY | Facility: CLINIC | Age: 48
End: 2023-06-01

## 2023-06-01 NOTE — TELEPHONE ENCOUNTER
Attempted to contact pt in regarding patient assistance. Per BH Verbal agreement, voicemail are not allowed to be left on pt phone. BH Verbal , new BH Verbal will need to be signed at the office. LVM to call the office back for a message. Office number provider.       HUB OK TO READ:     WE HAVE RECEIVED INFORMATION STATING THAT YOU NEED TO GO TO [email or web address] AND COMPLETE PATIENT ASSISTANCE PAPERWORK FOR , ONCE COMPLETE WE WILL BE NOTIFIED AND THEN CAN SEND TO THE PROVIDER FOR SIGNATURE.

## 2023-06-02 NOTE — TELEPHONE ENCOUNTER
I called the patient and he explained to explained to me that clive would send us the paperwork to complete

## 2023-07-24 DIAGNOSIS — E10.65 UNCONTROLLED TYPE 1 DIABETES MELLITUS WITH HYPERGLYCEMIA: ICD-10-CM

## 2023-07-24 RX ORDER — GABAPENTIN 400 MG/1
CAPSULE ORAL
Qty: 180 CAPSULE | Refills: 1 | Status: SHIPPED | OUTPATIENT
Start: 2023-07-24

## 2023-07-24 RX ORDER — LOSARTAN POTASSIUM 100 MG/1
TABLET ORAL
Qty: 30 TABLET | Refills: 12 | Status: SHIPPED | OUTPATIENT
Start: 2023-07-24

## 2023-07-24 NOTE — TELEPHONE ENCOUNTER
Rx Refill Note  Requested Prescriptions     Pending Prescriptions Disp Refills    losartan (COZAAR) 100 MG tablet [Pharmacy Med Name: LOSARTAN 100 MG  Tablet] 30 tablet 12     Sig: TAKE ONE TABLET BY MOUTH EVERY DAY      Last office visit with prescribing clinician: 3/31/2023   Last telemedicine visit with prescribing clinician: Visit date not found   Next office visit with prescribing clinician: 7/25/2023                         Would you like a call back once the refill request has been completed: [] Yes [] No    If the office needs to give you a call back, can they leave a voicemail: [] Yes [] No    Africa Gaspar [facility]  07/24/23, 16:23 EDT

## 2023-07-24 NOTE — TELEPHONE ENCOUNTER
Rx Refill Note  Requested Prescriptions     Pending Prescriptions Disp Refills    gabapentin (NEURONTIN) 400 MG capsule [Pharmacy Med Name: GABAPENTIN 400 MG CAPS 400 Capsule] 180 capsule 0     Sig: TAKE 2 CAPSULES BY MOUTH THREE TIMES A DAY      Last office visit with prescribing clinician: 3/31/2023   Last telemedicine visit with prescribing clinician: Visit date not found   Next office visit with prescribing clinician: 7/25/2023                         Would you like a call back once the refill request has been completed: [] Yes [] No    If the office needs to give you a call back, can they leave a voicemail: [] Yes [] No    Kathryn Bolden MA  07/24/23, 14:07 EDT

## 2023-07-25 ENCOUNTER — OFFICE VISIT (OUTPATIENT)
Dept: ENDOCRINOLOGY | Facility: CLINIC | Age: 48
End: 2023-07-25
Payer: COMMERCIAL

## 2023-07-25 VITALS
HEIGHT: 63 IN | HEART RATE: 80 BPM | SYSTOLIC BLOOD PRESSURE: 112 MMHG | DIASTOLIC BLOOD PRESSURE: 76 MMHG | WEIGHT: 181 LBS | BODY MASS INDEX: 32.07 KG/M2

## 2023-07-25 DIAGNOSIS — Z96.41 INSULIN PUMP IN PLACE: ICD-10-CM

## 2023-07-25 DIAGNOSIS — R80.9 TYPE 1 DIABETES MELLITUS WITH MICROALBUMINURIA: ICD-10-CM

## 2023-07-25 DIAGNOSIS — E10.649 HYPOGLYCEMIA DUE TO TYPE 1 DIABETES MELLITUS: ICD-10-CM

## 2023-07-25 DIAGNOSIS — E03.9 PRIMARY HYPOTHYROIDISM: ICD-10-CM

## 2023-07-25 DIAGNOSIS — E10.65 UNCONTROLLED TYPE 1 DIABETES MELLITUS WITH HYPERGLYCEMIA: Primary | ICD-10-CM

## 2023-07-25 DIAGNOSIS — E10.29 TYPE 1 DIABETES MELLITUS WITH MICROALBUMINURIA: ICD-10-CM

## 2023-07-25 DIAGNOSIS — E10.319 TYPE 1 DIABETES MELLITUS WITH RETINOPATHY, MACULAR EDEMA PRESENCE UNSPECIFIED, UNSPECIFIED LATERALITY, UNSPECIFIED RETINOPATHY SEVERITY: ICD-10-CM

## 2023-07-25 LAB
EXPIRATION DATE: NORMAL
EXPIRATION DATE: NORMAL
GLUCOSE BLDC GLUCOMTR-MCNC: 84 MG/DL (ref 70–130)
HBA1C MFR BLD: 6.8 %
Lab: NORMAL
Lab: NORMAL

## 2023-07-25 PROCEDURE — 3044F HG A1C LEVEL LT 7.0%: CPT | Performed by: INTERNAL MEDICINE

## 2023-07-25 PROCEDURE — 83036 HEMOGLOBIN GLYCOSYLATED A1C: CPT | Performed by: INTERNAL MEDICINE

## 2023-07-25 PROCEDURE — 1160F RVW MEDS BY RX/DR IN RCRD: CPT | Performed by: INTERNAL MEDICINE

## 2023-07-25 PROCEDURE — 1159F MED LIST DOCD IN RCRD: CPT | Performed by: INTERNAL MEDICINE

## 2023-07-25 PROCEDURE — 95251 CONT GLUC MNTR ANALYSIS I&R: CPT | Performed by: INTERNAL MEDICINE

## 2023-07-25 PROCEDURE — 99214 OFFICE O/P EST MOD 30 MIN: CPT | Performed by: INTERNAL MEDICINE

## 2023-07-25 NOTE — ASSESSMENT & PLAN NOTE
Diabetes is improving with treatment.  A1c looks good.  Continue current treatment regimen.    Diabetes will be reassessed in 3 months.    DexCom G6 CGM was downloaded today.  Data was reviewed from 7/11/23 to 7/24/23.  This showed some mild variability from day to day.  Occ postprandial spikes were seen.  However, no patterns were seen to make any pump adjustments.

## 2023-07-25 NOTE — PROGRESS NOTES
"     Office Note      Date: 2023  Patient Name: Patricio Holman  MRN: [id number]  : 1975    Chief Complaint   Patient presents with    Diabetes       History of Present Illness:   Patricio Holman is a 47 y.o. male who presents for Diabetes type 1. Diagnosed in: . Treated in past with insulin. Current treatments: Trulicity, Tandem pump and DexCom G6. Number of insulin shots per day: none - on pump. Checks blood sugar 288 times a day - on DexCom. Has low blood sugar: occasional. Aspirin use: Yes. Statin use: Yes. ACE-I/ARB use: Yes. Changes in health since last visit: having some strange sensation/fatigue in legs after standing for some time - saw his PCP. Last eye exam 3/2023.     He remains on T4 100mcg qd.  He is taking this correctly.  He isn't taking any interfering meds concurrently.  He denies any sxs of hypo- or hyperthyroidism at this time.    Subjective      Diabetic Complications:  Eyes: Yes - retinopathy  Kidneys: Yes - microalbuminuria  Feet: No  Heart: No    Diet and Exercise:  Meals per day: 3  Minutes of exercise per week: 0 mins.    Review of Systems:   Review of Systems   Constitutional: Negative.    Cardiovascular: Negative.    Gastrointestinal: Negative.    Endocrine: Negative.      The following portions of the patient's history were reviewed and updated as appropriate: allergies, current medications, past family history, past medical history, past social history, past surgical history, and problem list.    Objective       Visit Vitals  /76   Pulse 80   Ht 160 cm (62.99\")   Wt 82.1 kg (181 lb)   BMI 32.07 kg/m²       Physical Exam:  Physical Exam  Constitutional:       Appearance: Normal appearance.   Neurological:      Mental Status: He is alert.       Labs:    HbA1c  Lab Results   Component Value Date    HGBA1C 6.8 2023       CMP  Lab Results   Component Value Date    GLUCOSE 124 (H) 2022    BUN 12 2022    CREATININE 1.05 2022    EGFRIFNONA 80 " 06/28/2021    BCR 11.4 12/29/2022    K 3.5 12/29/2022    CO2 25.0 12/29/2022    CALCIUM 9.4 12/29/2022    AST 29 12/29/2022    ALT 27 12/29/2022        Lipid Panel  Lab Results   Component Value Date    HDL 66 (H) 12/29/2022    LDL 70 12/29/2022    TRIG 49 12/29/2022        TSH  Lab Results   Component Value Date    TSH 1.520 12/29/2022        Hemoglobin A1C  Lab Results   Component Value Date    HGBA1C 6.8 07/25/2023        Microalbumin/Creatinine  Lab Results   Component Value Date    MALBCRERATIO 40.0 12/29/2022    MICROALBUR 3.7 12/29/2022           Assessment / Plan      Assessment & Plan:  Diagnoses and all orders for this visit:    1. Uncontrolled type 1 diabetes mellitus with hyperglycemia (Primary)  Assessment & Plan:  Diabetes is improving with treatment.  A1c looks good.  Continue current treatment regimen.    Diabetes will be reassessed in 3 months.    DexCom G6 CGM was downloaded today.  Data was reviewed from 7/11/23 to 7/24/23.  This showed some mild variability from day to day.  Occ postprandial spikes were seen.  However, no patterns were seen to make any pump adjustments.    Orders:  -     POC Glycosylated Hemoglobin (Hb A1C)  -     POC Glucose, Blood    2. Hypoglycemia due to type 1 diabetes mellitus  Assessment & Plan:  Continue CGM.      3. Type 1 diabetes mellitus with retinopathy, macular edema presence unspecified, unspecified laterality, unspecified retinopathy severity  Assessment & Plan:  Continue ophthalmology follow up.      4. Type 1 diabetes mellitus with microalbuminuria  Assessment & Plan:  Continue ARB.      5. Primary hypothyroidism  Assessment & Plan:  Continue T4 tx.  Plan to check TSH next visit.    Orders:  -     Cancel: TSH; Future    6. Insulin pump in place      Current Outpatient Medications   Medication Instructions    ALPRAZolam (XANAX) 0.5 mg, Oral, As Needed, Prn for eye injection    aspirin 81 mg, Oral, Daily    atorvastatin (LIPITOR) 20 MG tablet TAKE ONE TABLET BY  MOUTH EVERY DAY    busPIRone (BUSPAR) 15 mg, Oral, Daily, 1/2 to 1 tablet 2 times a day    diphenhydrAMINE (BENADRYL) 25 mg, Oral, Nightly    fluticasone (FLONASE) 50 MCG/ACT nasal spray 2 sprays, Nasal, As Needed    gabapentin (NEURONTIN) 400 MG capsule TAKE 2 CAPSULES BY MOUTH THREE TIMES A DAY    Glucagon, rDNA, (Glucagon Emergency) 1 MG kit Use as directed for severe hypoglycemia    glucose blood (OneTouch Ultra) test strip Test 2 times daily    levothyroxine (SYNTHROID, LEVOTHROID) 100 mcg, Oral, Daily    losartan (COZAAR) 100 MG tablet TAKE ONE TABLET BY MOUTH EVERY DAY    meloxicam (MOBIC) 7.5 MG tablet TAKE ONE TABLET BY MOUTH DAILY WITH FOOD    NovoLOG 100 UNIT/ML injection USE AS DIRECTED IN INSULIN PUMP. MAX DAILY DOSE 100 UNITS    Trulicity 1.5 mg, Subcutaneous, Weekly      Return in about 3 months (around 10/25/2023) for Recheck with A1c, CMP, lipid, TSH, microalbumin, foot exam.    Reinaldo Sykes MD   07/25/2023

## 2023-08-23 RX ORDER — LEVOTHYROXINE SODIUM 0.1 MG/1
100 TABLET ORAL DAILY
Qty: 90 TABLET | Refills: 3 | Status: SHIPPED | OUTPATIENT
Start: 2023-08-23

## 2023-09-11 RX ORDER — DULAGLUTIDE 1.5 MG/.5ML
INJECTION, SOLUTION SUBCUTANEOUS
Qty: 6 ML | Refills: 3 | Status: SHIPPED | OUTPATIENT
Start: 2023-09-11

## 2023-09-11 NOTE — TELEPHONE ENCOUNTER
Rx Refill Note  Requested Prescriptions     Pending Prescriptions Disp Refills    Trulicity 1.5 MG/0.5ML solution pen-injector [Pharmacy Med Name: Trulicity Subcutaneous Solution Pen-injector 1.5 MG/0.5ML]  0     Sig: INJECT 1.5 MG (0.5ML) UNDER THE SKIN ONCE A WEEK      Last office visit with prescribing clinician: 7/25/2023     Next office visit with prescribing clinician: 11/30/2023

## 2023-09-20 RX ORDER — FLUTICASONE PROPIONATE 50 MCG
SPRAY, SUSPENSION (ML) NASAL
Qty: 16 G | Refills: 5 | Status: SHIPPED | OUTPATIENT
Start: 2023-09-20

## 2023-09-20 NOTE — TELEPHONE ENCOUNTER
Rx Refill Note  Requested Prescriptions     Pending Prescriptions Disp Refills    fluticasone (FLONASE) 50 MCG/ACT nasal spray [Pharmacy Med Name: FLUTICASONE 50 MCG NASAL SP 50 CALVIN] 16 g 5     Sig: USE 2 SPRAYS IN EACH NOSTRIL AS DIRECTED DAILY      Last office visit with prescribing clinician: 7/25/2023   Last telemedicine visit with prescribing clinician: Visit date not found   Next office visit with prescribing clinician: 11/30/2023                         Would you like a call back once the refill request has been completed: [] Yes [] No    If the office needs to give you a call back, can they leave a voicemail: [] Yes [] No    Kamran Peterson MA  09/20/23, 10:33 EDTRx Refill Note  Requested Prescriptions     Pending Prescriptions Disp Refills    fluticasone (FLONASE) 50 MCG/ACT nasal spray [Pharmacy Med Name: FLUTICASONE 50 MCG NASAL SP 50 CALVIN] 16 g 5     Sig: USE 2 SPRAYS IN EACH NOSTRIL AS DIRECTED DAILY      Last office visit with prescribing clinician: 7/25/2023   Last telemedicine visit with prescribing clinician: Visit date not found   Next office visit with prescribing clinician: 11/30/2023                         Would you like a call back once the refill request has been completed: [] Yes [] No    If the office needs to give you a call back, can they leave a voicemail: [] Yes [] No    Kamran Peterson MA  09/20/23, 10:33 EDT

## 2023-09-20 NOTE — TELEPHONE ENCOUNTER
Rx Refill Note  Requested Prescriptions     Pending Prescriptions Disp Refills    fluticasone (FLONASE) 50 MCG/ACT nasal spray [Pharmacy Med Name: FLUTICASONE 50 MCG NASAL SP 50 CALVIN] 16 g 5     Sig: USE 2 SPRAYS IN EACH NOSTRIL AS DIRECTED DAILY      Last office visit with prescribing clinician: 7/25/2023   Last telemedicine visit with prescribing clinician: Visit date not found   Next office visit with prescribing clinician: 11/30/2023                         Would you like a call back once the refill request has been completed: [] Yes [] No    If the office needs to give you a call back, can they leave a voicemail: [] Yes [] No    Kamran Peterson MA  09/20/23, 11:54 EDT

## 2023-09-27 DIAGNOSIS — E10.65 UNCONTROLLED TYPE 1 DIABETES MELLITUS WITH HYPERGLYCEMIA: ICD-10-CM

## 2023-09-27 RX ORDER — GABAPENTIN 400 MG/1
CAPSULE ORAL
Qty: 180 CAPSULE | Refills: 2 | Status: SHIPPED | OUTPATIENT
Start: 2023-09-27

## 2023-09-27 RX ORDER — ATORVASTATIN CALCIUM 20 MG/1
TABLET, FILM COATED ORAL
Qty: 30 TABLET | Refills: 5 | Status: SHIPPED | OUTPATIENT
Start: 2023-09-27

## 2023-09-27 NOTE — TELEPHONE ENCOUNTER
Rx Refill Note  Requested Prescriptions     Pending Prescriptions Disp Refills    gabapentin (NEURONTIN) 400 MG capsule [Pharmacy Med Name: GABAPENTIN 400 MG CAPS 400 Capsule] 180 capsule 1     Sig: TAKE 2 CAPSULES BY MOUTH THREE TIMES A DAY    atorvastatin (LIPITOR) 20 MG tablet [Pharmacy Med Name: ATORVASTATIN CALCIUM 20 MG 20 Tablet] 30 tablet 3     Sig: TAKE ONE TABLET BY MOUTH EVERY DAY        Last office visit with prescribing clinician: 7/25/2023      Next office visit with prescribing clinician: 11/30/2023       Ramila Alfaro (Jodi)  09/27/23, 10:39 EDT

## 2023-10-02 ENCOUNTER — TELEPHONE (OUTPATIENT)
Dept: ENDOCRINOLOGY | Facility: CLINIC | Age: 48
End: 2023-10-02
Payer: COMMERCIAL

## 2023-10-02 DIAGNOSIS — E10.65 UNCONTROLLED TYPE 1 DIABETES MELLITUS WITH HYPERGLYCEMIA: Primary | ICD-10-CM

## 2023-10-02 NOTE — TELEPHONE ENCOUNTER
Spoke to pt-he wants to get his labs done here for rheumatology.  He will fax over the order-I will ask jodi if this is possible and let him know.

## 2023-10-02 NOTE — TELEPHONE ENCOUNTER
CALLBACK [phone]    PATIENT IS REQUESTING TO SPEAK WITH [facility] AT [facility] REGARDING QUESTIONS CONCERNING LABS.

## 2023-11-13 ENCOUNTER — TELEPHONE (OUTPATIENT)
Dept: ENDOCRINOLOGY | Facility: CLINIC | Age: 48
End: 2023-11-13
Payer: COMMERCIAL

## 2023-11-13 NOTE — TELEPHONE ENCOUNTER
PATIENT CALLED TO FOLLOW UP ON FORMS FOR TRULICITY (PATIENT ASSISTANCE). THESE ARE BEING RESENT TO THIS OFFICE TODAY. THIS WILL COME FROM [facility]. PATIENT STATES THAT THIS HAS BEEN SENT TO THE OFFICE TWICE OVER THE PAST 2 MONTHS. PATIENT MISSED HIS DOSE ON SUNDAY.     CALL BACK [phone]

## 2023-11-30 ENCOUNTER — OFFICE VISIT (OUTPATIENT)
Dept: ENDOCRINOLOGY | Facility: CLINIC | Age: 48
End: 2023-11-30
Payer: COMMERCIAL

## 2023-11-30 VITALS
TEMPERATURE: 98.2 F | DIASTOLIC BLOOD PRESSURE: 64 MMHG | SYSTOLIC BLOOD PRESSURE: 112 MMHG | BODY MASS INDEX: 30.83 KG/M2 | WEIGHT: 174 LBS | RESPIRATION RATE: 20 BRPM | OXYGEN SATURATION: 98 % | HEART RATE: 78 BPM

## 2023-11-30 DIAGNOSIS — E03.9 PRIMARY HYPOTHYROIDISM: ICD-10-CM

## 2023-11-30 DIAGNOSIS — E10.29 TYPE 1 DIABETES MELLITUS WITH MICROALBUMINURIA: ICD-10-CM

## 2023-11-30 DIAGNOSIS — E10.319 TYPE 1 DIABETES MELLITUS WITH RETINOPATHY, MACULAR EDEMA PRESENCE UNSPECIFIED, UNSPECIFIED LATERALITY, UNSPECIFIED RETINOPATHY SEVERITY: ICD-10-CM

## 2023-11-30 DIAGNOSIS — R80.9 TYPE 1 DIABETES MELLITUS WITH MICROALBUMINURIA: ICD-10-CM

## 2023-11-30 DIAGNOSIS — E10.65 UNCONTROLLED TYPE 1 DIABETES MELLITUS WITH HYPERGLYCEMIA: Primary | ICD-10-CM

## 2023-11-30 DIAGNOSIS — E10.649 HYPOGLYCEMIA DUE TO TYPE 1 DIABETES MELLITUS: ICD-10-CM

## 2023-11-30 LAB
ALBUMIN SERPL-MCNC: 5 G/DL (ref 3.5–5.2)
ALBUMIN UR-MCNC: 1.4 MG/DL
ALBUMIN/GLOB SERPL: 1.9 G/DL
ALP SERPL-CCNC: 110 U/L (ref 39–117)
ALT SERPL W P-5'-P-CCNC: 34 U/L (ref 1–41)
ANION GAP SERPL CALCULATED.3IONS-SCNC: 14 MMOL/L (ref 5–15)
AST SERPL-CCNC: 25 U/L (ref 1–40)
BILIRUB SERPL-MCNC: 0.5 MG/DL (ref 0–1.2)
BUN SERPL-MCNC: 17 MG/DL (ref 6–20)
BUN/CREAT SERPL: 16.5 (ref 7–25)
CALCIUM SPEC-SCNC: 9.8 MG/DL (ref 8.6–10.5)
CHLORIDE SERPL-SCNC: 99 MMOL/L (ref 98–107)
CHOLEST SERPL-MCNC: 168 MG/DL (ref 0–200)
CHROMATIN AB SERPL-ACNC: <10 IU/ML (ref 0–14)
CO2 SERPL-SCNC: 22 MMOL/L (ref 22–29)
CREAT SERPL-MCNC: 1.03 MG/DL (ref 0.76–1.27)
CREAT UR-MCNC: 96.5 MG/DL
CRP SERPL-MCNC: 0.34 MG/DL (ref 0–0.5)
EGFRCR SERPLBLD CKD-EPI 2021: 89 ML/MIN/1.73
ERYTHROCYTE [SEDIMENTATION RATE] IN BLOOD: 16 MM/HR (ref 0–15)
EXPIRATION DATE: ABNORMAL
EXPIRATION DATE: ABNORMAL
GLOBULIN UR ELPH-MCNC: 2.7 GM/DL
GLUCOSE BLDC GLUCOMTR-MCNC: 272 MG/DL (ref 70–130)
GLUCOSE SERPL-MCNC: 242 MG/DL (ref 65–99)
HBA1C MFR BLD: 7.7 % (ref 4.5–5.7)
HDLC SERPL-MCNC: 70 MG/DL (ref 40–60)
LDLC SERPL CALC-MCNC: 87 MG/DL (ref 0–100)
LDLC/HDLC SERPL: 1.25 {RATIO}
Lab: ABNORMAL
Lab: ABNORMAL
MICROALBUMIN/CREAT UR: 14.5 MG/G (ref 0–29)
POTASSIUM SERPL-SCNC: 3.9 MMOL/L (ref 3.5–5.2)
PROT SERPL-MCNC: 7.7 G/DL (ref 6–8.5)
SODIUM SERPL-SCNC: 135 MMOL/L (ref 136–145)
TRIGL SERPL-MCNC: 53 MG/DL (ref 0–150)
TSH SERPL DL<=0.05 MIU/L-ACNC: 1.57 UIU/ML (ref 0.27–4.2)
VLDLC SERPL-MCNC: 11 MG/DL (ref 5–40)

## 2023-11-30 PROCEDURE — 82570 ASSAY OF URINE CREATININE: CPT | Performed by: INTERNAL MEDICINE

## 2023-11-30 PROCEDURE — 80053 COMPREHEN METABOLIC PANEL: CPT | Performed by: INTERNAL MEDICINE

## 2023-11-30 PROCEDURE — 86431 RHEUMATOID FACTOR QUANT: CPT | Performed by: INTERNAL MEDICINE

## 2023-11-30 PROCEDURE — 86140 C-REACTIVE PROTEIN: CPT | Performed by: INTERNAL MEDICINE

## 2023-11-30 PROCEDURE — 80061 LIPID PANEL: CPT | Performed by: INTERNAL MEDICINE

## 2023-11-30 PROCEDURE — 85652 RBC SED RATE AUTOMATED: CPT | Performed by: INTERNAL MEDICINE

## 2023-11-30 PROCEDURE — 86200 CCP ANTIBODY: CPT | Performed by: INTERNAL MEDICINE

## 2023-11-30 PROCEDURE — 84443 ASSAY THYROID STIM HORMONE: CPT | Performed by: INTERNAL MEDICINE

## 2023-11-30 PROCEDURE — 82043 UR ALBUMIN QUANTITATIVE: CPT | Performed by: INTERNAL MEDICINE

## 2023-11-30 NOTE — ASSESSMENT & PLAN NOTE
Diabetes is worsening. A1c increased but he has been without trulicity but has resumed this.  Continue current treatment regimen.  Resume trulicity.  Diabetes will be reassessed in 3 months.    DexCom G6 CGM was downloaded today.  Data was reviewed from 11/17/23 to 11/30/23.  This showed some day-to-day variability.  Some postprandial spikes.  No patterns for insulin adjustment at this time.

## 2023-11-30 NOTE — PROGRESS NOTES
Office Note      Date: 2023  Patient Name: Patricio Holman  MRN: [id number]  : 1975    Chief Complaint   Patient presents with    Diabetes       History of Present Illness:   Patricio Holman is a 48 y.o. male who presents for Diabetes type 1. Diagnosed in: . Treated in past with insulin. Current treatments: Trulicity, Tandem pump and DexCom G6. Number of insulin shots per day: none - on pump. Checks blood sugar 288 times a day - on DexCom. Has low blood sugar: occasional. Aspirin use: Yes. Statin use: Yes. ACE-I/ARB use: Yes. Changes in health since last visit: none. Last eye exam 3/2023.     He remains on T4 100mcg qd.  He is taking this correctly.  He isn't taking any interfering meds concurrently.  He denies any sxs of hypo- or hyperthyroidism at this time.    He has been without trulicity for 3 weeks.  He has started back on it now.  He noted some higher glucose during that time.    Subjective      Diabetic Complications:  Eyes: Yes - retinopathy  Kidneys: Yes - microalbuminuria  Feet: No  Heart: No    Diet and Exercise:  Meals per day: 3  Minutes of exercise per week: 0 mins.    Review of Systems:   Review of Systems   Constitutional: Negative.    Cardiovascular: Negative.    Gastrointestinal: Negative.    Endocrine: Negative.        The following portions of the patient's history were reviewed and updated as appropriate: allergies, current medications, past family history, past medical history, past social history, past surgical history, and problem list.    Objective     Visit Vitals  /64 (BP Location: Left arm, Patient Position: Sitting)   Pulse 78   Temp 98.2 °F (36.8 °C) (Temporal)   Resp 20   Wt 78.9 kg (174 lb)   SpO2 98%   BMI 30.83 kg/m²       Physical Exam:  Physical Exam  Constitutional:       Appearance: Normal appearance.   Neck:      Thyroid: No thyroid mass, thyromegaly or thyroid tenderness.   Cardiovascular:      Pulses:           Dorsalis pedis pulses are 2+ on  the right side and 2+ on the left side.        Posterior tibial pulses are 2+ on the right side and 2+ on the left side.   Musculoskeletal:      Left foot: Deformity present.   Feet:      Right foot:      Protective Sensation: 5 sites tested.  5 sites sensed.      Skin integrity: Skin integrity normal.      Toenail Condition: Right toenails are abnormally thick.      Left foot:      Protective Sensation: 5 sites tested.  5 sites sensed.      Skin integrity: Skin integrity normal.      Toenail Condition: Left toenails are abnormally thick.      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   Neurological:      Mental Status: He is alert.         Labs:    HbA1c  Lab Results   Component Value Date    HGBA1C 7.7 (A) 11/30/2023       CMP  Lab Results   Component Value Date    GLUCOSE 124 (H) 12/29/2022    BUN 12 12/29/2022    CREATININE 1.05 12/29/2022    EGFRIFNONA 80 06/28/2021    BCR 11.4 12/29/2022    K 3.5 12/29/2022    CO2 25.0 12/29/2022    CALCIUM 9.4 12/29/2022    AST 29 12/29/2022    ALT 27 12/29/2022        Lipid Panel  Lab Results   Component Value Date    HDL 66 (H) 12/29/2022    LDL 70 12/29/2022    TRIG 49 12/29/2022        TSH  Lab Results   Component Value Date    TSH 1.520 12/29/2022        Hemoglobin A1C  Lab Results   Component Value Date    HGBA1C 7.7 (A) 11/30/2023        Microalbumin/Creatinine  Lab Results   Component Value Date    MALBCRERATIO 40.0 12/29/2022    MICROALBUR 3.7 12/29/2022           Assessment / Plan      Assessment & Plan:  Diagnoses and all orders for this visit:    1. Uncontrolled type 1 diabetes mellitus with hyperglycemia (Primary)  Assessment & Plan:  Diabetes is worsening. A1c increased but he has been without trulicity but has resumed this.  Continue current treatment regimen.  Resume trulicity.  Diabetes will be reassessed in 3 months.    DexCom G6 CGM was downloaded today.  Data was reviewed from 11/17/23 to 11/30/23.  This showed  some day-to-day variability.  Some postprandial spikes.  No patterns for insulin adjustment at this time.    Orders:  -     POC Glucose, Blood  -     POC Glycosylated Hemoglobin (Hb A1C)    2. Hypoglycemia due to type 1 diabetes mellitus  Assessment & Plan:  Continue CGM.      3. Type 1 diabetes mellitus with retinopathy, macular edema presence unspecified, unspecified laterality, unspecified retinopathy severity  Assessment & Plan:  Continue ophthalmology follow up.      4. Type 1 diabetes mellitus with microalbuminuria  Assessment & Plan:  Continue ARB.  Check microalbumin today.      5. Primary hypothyroidism  Assessment & Plan:  Continue T4 tx.  Check TSH today.        Current Outpatient Medications   Medication Instructions    ALPRAZolam (XANAX) 0.5 mg, Oral, As Needed, Prn for eye injection and anxiety    aspirin 81 mg, Oral, Daily    atorvastatin (LIPITOR) 20 MG tablet TAKE ONE TABLET BY MOUTH EVERY DAY    diphenhydrAMINE (BENADRYL) 25 mg, Oral, Nightly    Dulaglutide (Trulicity) 1.5 MG/0.5ML solution pen-injector INJECT 1.5 MG (0.5ML) UNDER THE SKIN ONCE A WEEK    fluticasone (FLONASE) 50 MCG/ACT nasal spray USE 2 SPRAYS IN EACH NOSTRIL AS DIRECTED DAILY    gabapentin (NEURONTIN) 400 MG capsule TAKE 2 CAPSULES BY MOUTH THREE TIMES A DAY    Glucagon, rDNA, (Glucagon Emergency) 1 MG kit Use as directed for severe hypoglycemia    glucose blood (OneTouch Ultra) test strip Test 2 times daily    levothyroxine (SYNTHROID, LEVOTHROID) 100 mcg, Oral, Daily    losartan (COZAAR) 100 MG tablet TAKE ONE TABLET BY MOUTH EVERY DAY    meloxicam (MOBIC) 7.5 MG tablet TAKE ONE TABLET BY MOUTH DAILY WITH FOOD    NovoLOG 100 UNIT/ML injection USE AS DIRECTED IN INSULIN PUMP. MAX DAILY DOSE 100 UNITS      Return in about 3 months (around 2/29/2024) for Recheck with A1c.    Reinaldo Sykes MD   11/30/2023

## 2023-12-01 LAB — CCP IGA+IGG SERPL IA-ACNC: 2 UNITS (ref 0–19)

## 2024-01-04 DIAGNOSIS — E10.65 UNCONTROLLED TYPE 1 DIABETES MELLITUS WITH HYPERGLYCEMIA: ICD-10-CM

## 2024-01-05 RX ORDER — GABAPENTIN 400 MG/1
CAPSULE ORAL
Qty: 180 CAPSULE | Refills: 2 | Status: SHIPPED | OUTPATIENT
Start: 2024-01-05

## 2024-01-05 NOTE — TELEPHONE ENCOUNTER
Rx Refill Note  Requested Prescriptions     Pending Prescriptions Disp Refills    gabapentin (NEURONTIN) 400 MG capsule [Pharmacy Med Name: GABAPENTIN 400 MG CAPS 400 Capsule] 180 capsule 2     Sig: TAKE 2 CAPSULES BY MOUTH THREE TIMES A DAY   Dx Code:   E10.65  Last office visit with prescribing clinician: 11/30/2023   Last telemedicine visit with prescribing clinician: Visit date not found   Next office visit with prescribing clinician: 4/10/2024                         Would you like a call back once the refill request has been completed: [] Yes [] No    If the office needs to give you a call back, can they leave a voicemail: [] Yes [] No    Kathryn Bolden MA  01/05/24, 08:31 EST

## 2024-02-27 RX ORDER — BLOOD SUGAR DIAGNOSTIC
STRIP MISCELLANEOUS
Qty: 100 EACH | Refills: 3 | Status: SHIPPED | OUTPATIENT
Start: 2024-02-27

## 2024-02-27 NOTE — TELEPHONE ENCOUNTER
Rx Refill Note  Requested Prescriptions     Pending Prescriptions Disp Refills    OneTouch Ultra test strip [Pharmacy Med Name: ONE TOUCH ULTRA STRIPS IP] 100 each 3     Sig: TEST 2 TIMES DAILY      Last office visit with prescribing clinician: 11/30/2023   Last telemedicine visit with prescribing clinician: Visit date not found   Next office visit with prescribing clinician: 4/10/2024                         Would you like a call back once the refill request has been completed: [] Yes [] No    If the office needs to give you a call back, can they leave a voicemail: [] Yes [] No    Kamran Peterson MA  02/27/24, 13:05 EST

## 2024-04-01 ENCOUNTER — OFFICE VISIT (OUTPATIENT)
Dept: ENDOCRINOLOGY | Facility: CLINIC | Age: 49
End: 2024-04-01
Payer: COMMERCIAL

## 2024-04-01 VITALS
HEIGHT: 63 IN | WEIGHT: 179 LBS | DIASTOLIC BLOOD PRESSURE: 60 MMHG | HEART RATE: 69 BPM | BODY MASS INDEX: 31.71 KG/M2 | SYSTOLIC BLOOD PRESSURE: 100 MMHG

## 2024-04-01 DIAGNOSIS — E10.8: Primary | ICD-10-CM

## 2024-04-01 DIAGNOSIS — Z96.41 INSULIN PUMP IN PLACE: ICD-10-CM

## 2024-04-01 DIAGNOSIS — E66.09 CLASS 1 OBESITY DUE TO EXCESS CALORIES WITH SERIOUS COMORBIDITY AND BODY MASS INDEX (BMI) OF 31.0 TO 31.9 IN ADULT: ICD-10-CM

## 2024-04-01 DIAGNOSIS — E03.9 PRIMARY HYPOTHYROIDISM: ICD-10-CM

## 2024-04-01 PROBLEM — E10.40 TYPE 1 DIABETES MELLITUS WITH DIABETIC NEUROPATHY: Status: ACTIVE | Noted: 2024-04-01

## 2024-04-01 LAB
EXPIRATION DATE: ABNORMAL
EXPIRATION DATE: ABNORMAL
GLUCOSE BLDC GLUCOMTR-MCNC: 167 MG/DL (ref 70–130)
HBA1C MFR BLD: 7.3 % (ref 4.5–5.7)
Lab: ABNORMAL
Lab: ABNORMAL

## 2024-04-01 RX ORDER — DULAGLUTIDE 3 MG/.5ML
3 INJECTION, SOLUTION SUBCUTANEOUS WEEKLY
Start: 2024-04-01 | End: 2024-04-03 | Stop reason: DRUGHIGH

## 2024-04-01 NOTE — PROGRESS NOTES
Chief Complaint   Patient presents with    Diabetes      HPI   Patricio Holman is a 48 y.o. male who presents today for follow-up type 1 diabetes with hyperglycemia, neuropathy, retinopathy, microalbuminuria.     Current diabetes regimen includes: Tandum pump. Trulicity 1.5 mg q1w (restarted 12/2023). Gabapentin 800 mg TID. Losartan 100 mg QD. Atorvastatin 20 mg QD.   Compliance with medications is good or poor, tolerating well.     Patient overall doing well. Reports has noticed improvement in glucose control initially with restarting Trulicity, but feels like not working as well recently. Admits progressive neuropathy in hands and feet for which he is taking gabapentin with relief. Admits hypoglycemia at times he attributes to variable schedule/eating times, without severe hypoglycemia. Denies significant changes in vision, open sores, abdominal pain, nausea, heartburn, constipation, increased thirst or urination.    CGM review  Dates: 3/18/24-4/1/24  Data: 54% in range, 26% high, 19% very high, 0.5% low; variability 35%. 49.73 units daily (22 units basal, 27 units bolus)  Interpretation: Significant post-prandial associated hyperglycemia despite manually entering carbs before meals. Occasional meal entries well controlled with bolus.     DM Health Maintenance:  Ophthalmology:Dr. Enriquez - [facility] [place] 12/2023, follows q6m, diabetic retinopathy/macular edema, cataract, follow-up scheduled 6/2024  Monofilament / Foot exam: performed today   Urine microalbumin: normal (11/30/2023)    Review of Systems   Constitutional:  Negative for activity change, appetite change, unexpected weight gain and unexpected weight loss.   Eyes:  Negative for visual disturbance.   Respiratory:  Negative for shortness of breath.    Cardiovascular:  Negative for chest pain.   Gastrointestinal:  Negative for abdominal pain, constipation and diarrhea.   Endocrine: Negative for polydipsia and polyuria.   Skin:  Negative  "for wound.   Neurological:  Positive for numbness.        Physical Exam  Constitutional:       Appearance: Normal appearance. He is obese.   Cardiovascular:      Rate and Rhythm: Normal rate.      Pulses:           Dorsalis pedis pulses are 1+ on the right side and 1+ on the left side.        Posterior tibial pulses are 1+ on the right side and 1+ on the left side.   Pulmonary:      Effort: Pulmonary effort is normal.   Musculoskeletal:         General: Normal range of motion.   Feet:      Right foot:      Protective Sensation: 10 sites tested.  3 sites sensed.      Skin integrity: No ulcer.      Left foot:      Protective Sensation: 10 sites tested.  3 sites sensed.      Skin integrity: No ulcer.      Toenail Condition: Left toenails are abnormally thick.      Comments: Diabetic Foot Exam Performed and Monofilament Test Performed     Neurological:      General: No focal deficit present.      Mental Status: He is alert and oriented to person, place, and time.   Psychiatric:         Mood and Affect: Mood normal.         Behavior: Behavior normal.        /60   Pulse 69   Ht 160 cm (62.99\")   Wt 81.2 kg (179 lb)   BMI 31.72 kg/m²      Labs and imaging    CMP:  Lab Results   Component Value Date    BUN 17 11/30/2023    CREATININE 1.03 11/30/2023    EGFR 89.0 11/30/2023    BCR 16.5 11/30/2023     (L) 11/30/2023    K 3.9 11/30/2023    CO2 22.0 11/30/2023    CALCIUM 9.8 11/30/2023    ALBUMIN 5.0 11/30/2023    BILITOT 0.5 11/30/2023    ALKPHOS 110 11/30/2023    AST 25 11/30/2023    ALT 34 11/30/2023     Lipid Panel:  Lab Results   Component Value Date    CHOL 168 11/30/2023    TRIG 53 11/30/2023    HDL 70 (H) 11/30/2023    VLDL 11 11/30/2023    LDL 87 11/30/2023     HbA1c:  Lab Results   Component Value Date    HGBA1C 7.3 (A) 04/01/2024    HGBA1C 7.7 (A) 11/30/2023     Glucose:    Lab Results   Component Value Date    POCGLU 167 (A) 04/01/2024     Microalbumin:  Lab Results   Component Value Date    " RAMONA 14.5 11/30/2023     TSH:  Lab Results   Component Value Date    TSH 1.570 11/30/2023       Assessment and plan  Diagnoses and all orders for this visit:    1. Type 1 diabetes mellitus with multiple complications (Primary)  Assessment & Plan:  A1C 7.3% today.  Not controlled, but improved with restarting Trulicity.   CGM data reviewed with significant post-prandial associated hyperglycemia despite manually entering carbs before meals. Occasional meal entries well controlled with bolus.     Given post-prandial spikes would likely benefit from increase Trulicity 3 mg and he is interested in increased dose. Request sent to MA for updated patient assistance forms reflecting dose adjustment through [employer] Patient has features of insulin resistance and metabolic syndrome and is benefitting greatly from GLP-1 agonist adjunctive therapy. He has multiple complications and any measure that helps to improve glycemic control and decrease insulin requirements will decreased morbidity and mortality.   Given increase in Trulicity no changes to pump settings at this time; encouraged patient to make sure he is counting carbohydrates at meals.   Discussed with patient increase dose Trulicity may result in needing to reduce carb or correction ratios and advised contacting the office if he begins to experience recurrent hypoglycemia for dose adjustment.   Continue gabapentin for neuropathy, losartan for microalbuminuria, atorvastatin for cardiovascular prevention.   Encouraged daily foot exam and continued close follow-up with ophthalmology. Advised podiatry as needed for nail thickening left great toe.     Orders:  -     POC Glucose, Blood  -     POC Glycosylated Hemoglobin (Hb A1C)  -     Dulaglutide (Trulicity) 3 MG/0.5ML solution pen-injector; Inject 0.5 mL under the skin into the appropriate area as directed 1 (One) Time Per Week.    2. Insulin pump in place  Assessment & Plan:  Continue CGM.       3. Primary  hypothyroidism  Assessment & Plan:  Controlled, continue current dose levothyroxine 100 mcg.       4. Class 1 obesity due to excess calories with serious comorbidity and body mass index (BMI) of 31.0 to 31.9 in adult  Assessment & Plan:  Patient's (Body mass index is 31.72 kg/m².) indicates that they are obese (BMI >30) with health conditions that include diabetes mellitus . Weight is unchanged. BMI  is above average; BMI management plan is completed. We discussed portion control, increasing exercise, and pharmacologic options including increase Trulicity .            Return in about 3 months (around 7/1/2024) for follow-up diabetes. The patient was instructed to contact the clinic with any interval questions or concerns.    Fidelina Baltazar PA-C   Endocrinology     Please note that portions of this note were completed with a voice recognition program.

## 2024-04-02 ENCOUNTER — TELEPHONE (OUTPATIENT)
Dept: ENDOCRINOLOGY | Facility: CLINIC | Age: 49
End: 2024-04-02

## 2024-04-02 PROBLEM — E10.29 TYPE 1 DIABETES MELLITUS WITH MICROALBUMINURIA: Status: ACTIVE | Noted: 2024-04-02

## 2024-04-02 PROBLEM — E10.8: Status: ACTIVE | Noted: 2020-11-20

## 2024-04-02 PROBLEM — R80.9 TYPE 1 DIABETES MELLITUS WITH MICROALBUMINURIA: Status: ACTIVE | Noted: 2024-04-02

## 2024-04-02 PROBLEM — E10.8: Status: ACTIVE | Noted: 2024-04-01

## 2024-04-02 NOTE — TELEPHONE ENCOUNTER
PT WANTS A CALL BACK FROM MERLE, PT WOULDN'T TELL ME WHY BUT HE CHRISTIAN HE WANTED TO SPEAK WITH MERLE

## 2024-04-02 NOTE — ASSESSMENT & PLAN NOTE
A1C 7.3% today.  Not controlled, but improved with restarting Trulicity.   CGM data reviewed with significant post-prandial associated hyperglycemia despite manually entering carbs before meals. Occasional meal entries well controlled with bolus.     Given post-prandial spikes would likely benefit from increase Trulicity 3 mg and he is interested in increased dose. Request sent to MA for updated patient assistance forms reflecting dose adjustment through [employer]. Patient has features of insulin resistance and metabolic syndrome and is benefitting greatly from GLP-1 agonist adjunctive therapy. He has multiple complications and any measure that helps to improve glycemic control and decrease insulin requirements will decreased morbidity and mortality.   Given increase in Trulicity no changes to pump settings at this time; encouraged patient to make sure he is counting carbohydrates at meals.   Discussed with patient increase dose Trulicity may result in needing to reduce carb or correction ratios and advised contacting the office if he begins to experience recurrent hypoglycemia for dose adjustment.   Continue gabapentin for neuropathy, losartan for microalbuminuria, atorvastatin for cardiovascular prevention.   Encouraged daily foot exam and continued close follow-up with ophthalmology. Advised podiatry as needed for nail thickening left great toe.

## 2024-04-02 NOTE — ASSESSMENT & PLAN NOTE
Patient's (Body mass index is 31.72 kg/m².) indicates that they are obese (BMI >30) with health conditions that include diabetes mellitus . Weight is unchanged. BMI  is above average; BMI management plan is completed. We discussed portion control, increasing exercise, and pharmacologic options including increase Trulicity .

## 2024-04-03 ENCOUNTER — TELEPHONE (OUTPATIENT)
Dept: ENDOCRINOLOGY | Facility: CLINIC | Age: 49
End: 2024-04-03
Payer: COMMERCIAL

## 2024-04-03 DIAGNOSIS — E10.65 UNCONTROLLED TYPE 1 DIABETES MELLITUS WITH HYPERGLYCEMIA: ICD-10-CM

## 2024-04-03 RX ORDER — ATORVASTATIN CALCIUM 20 MG/1
TABLET, FILM COATED ORAL
Qty: 30 TABLET | Refills: 12 | Status: SHIPPED | OUTPATIENT
Start: 2024-04-03

## 2024-04-03 RX ORDER — GABAPENTIN 400 MG/1
CAPSULE ORAL
Qty: 180 CAPSULE | Refills: 2 | Status: SHIPPED | OUTPATIENT
Start: 2024-04-03

## 2024-04-03 RX ORDER — DULAGLUTIDE 1.5 MG/.5ML
1.5 INJECTION, SOLUTION SUBCUTANEOUS WEEKLY
Start: 2024-04-03

## 2024-04-03 NOTE — TELEPHONE ENCOUNTER
Rx Refill Note  Requested Prescriptions     Pending Prescriptions Disp Refills    gabapentin (NEURONTIN) 400 MG capsule [Pharmacy Med Name: GABAPENTIN 400 MG CAPS 400 Capsule] 180 capsule 2     Sig: TAKE 2 CAPSULES BY MOUTH THREE TIMES A DAY    atorvastatin (LIPITOR) 20 MG tablet [Pharmacy Med Name: ATORVASTATIN 20 MG TAB 20 Tablet] 30 tablet 12     Sig: TAKE ONE TABLET BY MOUTH EVERY DAY          Last office visit with prescribing clinician: 11/30/2023     Next office visit with prescribing clinician: 10/14/2024         Ruth Heranndez MA  04/03/24, 14:19 EDT

## 2024-04-04 RX ORDER — INSULIN ASPART 100 [IU]/ML
INJECTION, SOLUTION INTRAVENOUS; SUBCUTANEOUS
Qty: 30 ML | Refills: 12 | Status: SHIPPED | OUTPATIENT
Start: 2024-04-04

## 2024-04-04 NOTE — TELEPHONE ENCOUNTER
Patient called and reported has been working on entering more accurate carbs - noticed improved glucose control yesterday and would like to stick with current dose of Trulicity for now - will consider increase at next office visit.

## 2024-04-04 NOTE — TELEPHONE ENCOUNTER
Rx Refill Note  Requested Prescriptions     Pending Prescriptions Disp Refills    NovoLOG 100 UNIT/ML injection [Pharmacy Med Name: NOVOLOG 100U/ML VIAL 100 Solution] 30 mL 12     Sig: USE AS DIRECTED IN INSULIN PUMP. MAX DAILY DOSE 100 UNITS          Last office visit with prescribing clinician: 11/30/2023     Next office visit with prescribing clinician: 10/14/2024         Ruth Hernandez MA  04/04/24, 11:12 EDT

## 2024-04-08 ENCOUNTER — TELEPHONE (OUTPATIENT)
Dept: ENDOCRINOLOGY | Facility: CLINIC | Age: 49
End: 2024-04-08

## 2024-04-08 ENCOUNTER — PRIOR AUTHORIZATION (OUTPATIENT)
Dept: ENDOCRINOLOGY | Facility: CLINIC | Age: 49
End: 2024-04-08
Payer: COMMERCIAL

## 2024-04-09 NOTE — TELEPHONE ENCOUNTER
Patricio Holman (Key: [id number])  PA Case ID #: [id number]  Need Help? Call us at [phone]  Outcome  Approved on April 8  The request has been approved. The authorization is effective from 04/08/2024 to 04/07/2025, as long as the member is enrolled in their current health plan. The request was reviewed and approved by a licensed clinical pharmacist. This request was approved with a quantity limit of 180 capsules per 30 days. A written notification letter will follow with additional details.  Authorization Expiration Date: 4/6/2025  Drug  Gabapentin 400MG capsules  ePA cloud logo  Form  [employer] [place] Medicaid ePA Form 2017 NCPDP

## 2024-05-03 RX ORDER — IBUPROFEN 600 MG/1
TABLET ORAL
Qty: 2 EACH | Refills: 1 | Status: SHIPPED | OUTPATIENT
Start: 2024-05-03

## 2024-05-03 NOTE — TELEPHONE ENCOUNTER
Rx Refill Note  Requested Prescriptions     Pending Prescriptions Disp Refills    glucagon (GLUCAGEN) 1 MG injection [Pharmacy Med Name: GLUCAGON 1 MG EMERGENCY KIT 1 Kit]  1     Sig: USE AS DIRECTED FOR SEVERE HYPOGLYCEMIA      Last office visit with prescribing clinician: 11/30/2023   Last telemedicine visit with prescribing clinician: Visit date not found   Next office visit with prescribing clinician: 10/14/2024                         Would you like a call back once the refill request has been completed: [] Yes [] No    If the office needs to give you a call back, can they leave a voicemail: [] Yes [] No    Kamran Peterson MA  05/03/24, 14:20 EDT

## 2024-05-07 ENCOUNTER — TELEPHONE (OUTPATIENT)
Dept: ENDOCRINOLOGY | Facility: CLINIC | Age: 49
End: 2024-05-07

## 2024-05-07 NOTE — TELEPHONE ENCOUNTER
Caller: BETY ALEJANDRO    Relationship to patient: SELF    Best call back number: [phone]    Patient is needing: PATIENT WOULD LIKE A CALL BACK ON HIS SETTINGS. PLEASE ADVISE

## 2024-05-08 ENCOUNTER — TELEPHONE (OUTPATIENT)
Dept: ENDOCRINOLOGY | Facility: CLINIC | Age: 49
End: 2024-05-08
Payer: COMMERCIAL

## 2024-05-08 NOTE — TELEPHONE ENCOUNTER
Pt returned call, requested to have Dr. Sykes or Colten PAC review pump reports to see if he needs any adjustments. Pt has been back on Trulicity 4+ weeks, overall control better - TIR 69%.  Pt also noted he is using new pump (warranty replacement) all settings were transferred with assistance of [employer] customer service. Only been using x 1 day.  Pt requested call back if changes are needed, otherwise no call needed.  Pump report printed for provider review.

## 2024-05-13 ENCOUNTER — TELEPHONE (OUTPATIENT)
Dept: ENDOCRINOLOGY | Facility: CLINIC | Age: 49
End: 2024-05-13
Payer: COMMERCIAL

## 2024-05-13 NOTE — TELEPHONE ENCOUNTER
Spoke with patient in regard to recent BG and changing pump settings - walked through to change settings to 1:10 carb ratio to reduce postprandial hypoglycemia.     Advised to contact office if continued concerns for hypoglycemia/hyperglycemia or recheck settings in 1-2 weeks.

## 2024-05-20 ENCOUNTER — TELEPHONE (OUTPATIENT)
Dept: ENDOCRINOLOGY | Facility: CLINIC | Age: 49
End: 2024-05-20
Payer: COMMERCIAL

## 2024-05-20 NOTE — TELEPHONE ENCOUNTER
"    Caller: Patricio Holman \"Guillermo\"    Relationship to patient: Self    Best call back number: [phone]    Patient is needing: PTT IS WANTING A CALL BACK FROM REMY REGARDING HIS PUMP. PLEASE CALL PT TO ADVISE.         "

## 2024-05-20 NOTE — TELEPHONE ENCOUNTER
Returned call, pt had questions regarding upgrading to the G7 on his Tandem. Reviewed update process - pt will do software update and then call DME for update who will send forms for provider signature.  Pt also requested to have Fidelina review Tandem reports for any modifications. Reports printed for provider review.

## 2024-05-21 DIAGNOSIS — E10.8: Primary | ICD-10-CM

## 2024-05-21 RX ORDER — GLUCAGON INJECTION, SOLUTION 1 MG/.2ML
1 INJECTION, SOLUTION SUBCUTANEOUS AS NEEDED
Qty: 1 ML | Refills: 2 | Status: SHIPPED | OUTPATIENT
Start: 2024-05-21

## 2024-05-21 NOTE — TELEPHONE ENCOUNTER
Spoke with patient in regard to pump - had few instances of occlusion with pump attributing to hyperglycemia - encouraged rotating pump sites/avoid scar tissue area and change site as soon as notices BG increasing regardless of meal or unresponsive to bolus/corrections.     Discussed mostly improved hypoglycemia at meals, but few hyperglycemia events following meals in evening. Patient believes this may be due to errors with CHO counting eating out. Advised continue monitoring for now, we can make the evening CHO ratio more aggressive if continues elevated. He is interested in additional meeting with nutritionist to review carb counting - requested referral.

## 2024-05-22 ENCOUNTER — PRIOR AUTHORIZATION (OUTPATIENT)
Dept: ENDOCRINOLOGY | Facility: CLINIC | Age: 49
End: 2024-05-22
Payer: COMMERCIAL

## 2024-05-22 NOTE — TELEPHONE ENCOUNTER
Approvedtoday  The request has been approved. The authorization is effective from 05/22/2024 to 05/22/2025, as long as the member is enrolled in their current health plan. A written notification letter will follow with additional details.  Drug  Gvoke HypoPen 2-Pack 1MG/0.2ML auto-injectors  Form  [facility] [place] [facility] ePA Form 2017 NCPDP

## 2024-06-11 ENCOUNTER — HOSPITAL ENCOUNTER (OUTPATIENT)
Dept: DIABETES SERVICES | Facility: HOSPITAL | Age: 49
Setting detail: RECURRING SERIES
Discharge: HOME | End: 2024-06-11
Payer: COMMERCIAL

## 2024-06-11 ENCOUNTER — TELEPHONE (OUTPATIENT)
Dept: ENDOCRINOLOGY | Facility: CLINIC | Age: 49
End: 2024-06-11

## 2024-06-11 NOTE — PROGRESS NOTES
Patient attended an initial Diabetes education class for 60 minutes.  EPIC users, please see media tab for assessments and notes.  Non-EPIC users, assessments and notes will be sent per protocol.

## 2024-06-11 NOTE — TELEPHONE ENCOUNTER
"    Caller: Patricio Holman \"Guillermo\"    Relationship to patient: Self    Best call back number: [phone]    Patient is needing: NEEDS SOME PUMP ADJUSTMENTS. PLEASE CALL PT TO ADVISE.         "

## 2024-07-01 ENCOUNTER — OFFICE VISIT (OUTPATIENT)
Dept: ENDOCRINOLOGY | Facility: CLINIC | Age: 49
End: 2024-07-01
Payer: COMMERCIAL

## 2024-07-01 ENCOUNTER — TELEPHONE (OUTPATIENT)
Dept: ENDOCRINOLOGY | Facility: CLINIC | Age: 49
End: 2024-07-01

## 2024-07-01 VITALS
OXYGEN SATURATION: 97 % | HEIGHT: 63 IN | DIASTOLIC BLOOD PRESSURE: 60 MMHG | HEART RATE: 67 BPM | BODY MASS INDEX: 29.66 KG/M2 | WEIGHT: 167.4 LBS | SYSTOLIC BLOOD PRESSURE: 102 MMHG

## 2024-07-01 DIAGNOSIS — E10.8: Primary | ICD-10-CM

## 2024-07-01 DIAGNOSIS — G62.9 NEUROPATHY: ICD-10-CM

## 2024-07-01 DIAGNOSIS — E10.649 HYPOGLYCEMIA DUE TO TYPE 1 DIABETES MELLITUS: ICD-10-CM

## 2024-07-01 PROBLEM — L80 VITILIGO: Status: ACTIVE | Noted: 2024-07-01

## 2024-07-01 PROBLEM — M25.549 ARTHRALGIA OF HAND: Status: ACTIVE | Noted: 2024-01-22

## 2024-07-01 PROBLEM — F41.9 ANXIETY: Status: ACTIVE | Noted: 2024-07-01

## 2024-07-01 LAB
EXPIRATION DATE: ABNORMAL
EXPIRATION DATE: ABNORMAL
GLUCOSE BLDC GLUCOMTR-MCNC: 264 MG/DL (ref 70–130)
HBA1C MFR BLD: 6.7 % (ref 4.5–5.7)
Lab: ABNORMAL
Lab: ABNORMAL

## 2024-07-01 RX ORDER — CITALOPRAM 20 MG/1
20 TABLET ORAL DAILY
COMMUNITY

## 2024-07-01 RX ORDER — MELOXICAM 7.5 MG/1
TABLET ORAL
Start: 2024-07-01

## 2024-07-01 NOTE — ASSESSMENT & PLAN NOTE
Diabetes is controlled, although concerns for occasional hypoglycemia.   A1C 6.7% today.  CGM Interpretation: Mostly stable blood sugars overnight with frequent postprandial hyperglycemia and delayed hypoglycemia; late bolusing at meals.    Encouraged contacting [employer] for upgrade to G7 and pump training; will additionally request scheduling with diabetes educator.     Rx: Continue decrease carb ratio 1:12 due to postprandial hypoglycemia-pump settings changed today.  Continue Trulicity 1.5 mg weekly.    Foot exam up-to-date.  Microalbumin up-to-date.  Ophthalmology up-to-date.    Encouraged bolus at least 15 minutes before meals for improved hyperglycemic control postprandially.  Encouraged consistency with carb counting.     Blood Glucose Goal: Blood glucose goal <150 fasting, <180 2 hr postprandial. Encouraged monitoring daily.   Discussed yearly microalbumin, annual eye examinations with Ophthalmology, and foot care.  Discussed medications, side effects and compliance.  Discussed hypoglycemia management and prevention.   Reviewed ‘ABCs’ of diabetes management (respective goals in parentheses):  A1C (<7), blood pressure (<130/80), and cholesterol (LDL <100, if CVD <70).

## 2024-07-01 NOTE — TELEPHONE ENCOUNTER
Sent pt a [employer] message to contact his [employer]ol[employer] about the change to g7-message back if questtions

## 2024-07-01 NOTE — TELEPHONE ENCOUNTER
----- Message from Fidelina Baltazar sent at 7/1/2024  1:41 PM EDT -----  Regarding: DME update to G7  Patient wanting to upgrade from G6 to G7 sensor - advised patient to contact [employer] and DME for update; do we need to request additional information to DME?

## 2024-07-01 NOTE — PROGRESS NOTES
Chief Complaint   Patient presents with    Diabetes      HPI  Patricio Holman is a 48 y.o. male presents today for follow-up evaluation of type I diabetes. They were last seen for this 4/1/2024, A1c 7.3%.  He deferred increase in Trulicity at the time.  His carb ratio was increased to 1:10 given frequent hypoglycemia few hours after meals.     Since last visit reports continued hypoglycemia-mostly occurring between 12:00 to 6:00 PM.  Has varied eating schedule, but mostly skips breakfast, has lunch in the afternoon, dinner and evening.  Occasional snack in the evening.  Drinks mostly water, diet lemonade.    Reports had trigger injection 6/27/24; denies changes in blood sugar since injection.  Continues to have limited range of motion in hands.  Continues with numbness/tingling in legs and feeling off balance which is contributed to falls.  Has abrasions to left shin from recent fall, healing well over time.  He has history of nerve compression and back.  He follows with chiropractor/orthopedic.  He is interested in having nerve conduction study for further evaluation given worsening balance.     He is also interested in switching to G7.  He has not yet contacted tandem in regards to this. He is also interested in scheduling additional pump training.    He is also requesting a life alert prescription due to history of diabetes.    Without additional concerns today.     Type 1 diabetes:   Complications: Hyperglycemia, neuropathy, retinopathy, microalbuminuria    Current regimen includes: Tandum pump. Trulicity 1.5 mg q1w (restarted 12/2023; receiving through [place]), Dexcom G6  Compliance with medications is good.  -HTN: Losartan 100 mg  -HLD: Atorvastatin 20 mg  - Aspirin: 81 mg    CGM Download  -Dates reviewed: 6/18/2024 to 7/1/2024  -Data: 71% in range, 17% high, 9.9% very high, 1.7% low, 0.5% very low; GMI: 7.1%; variation 39%; active CGM time 92%  -Interpretation: Mostly stable blood sugars overnight with  frequent postprandial hyperglycemia and hypoglycemia; late bolusing at meals.    Pump settings:  Basal: 17.5 units, bolus: 20.5 units  Correction factor: 1:47 9:00 PM to 3 AM; 1:43 3 AM to 9 PM  Carb ratio: 1:10   Target B    DM Health Maintenance:  Ophthalmology:Dr. Enriquez - [facility] 2023, follows q6m, diabetic retinopathy/macular edema, cataract, follow-up scheduled 2024  Monofilament / Foot exam: 2024  Urine microalbumin: normal (2023)     Hypothyroidism:  Current regimen includes: Levothyroxine 100 mcg    TSH: 1.57, 2023    Past Medical History:   Diagnosis Date    Abnormal liver function tests     Anxiety disorder     Arthralgia of ankle     arthralgia of ankle and/or foot-Abstracted from [facility]    Bipolar 1 disorder with moderate jovita     Bipolar affective disorder, current episode manic     Diabetic oculopathy associated with type 1 diabetes mellitus     Disorder associated with type 1 diabetes mellitus     DJD (degenerative joint disease) of thoracic spine     Encephalitis     viral    Fibromyositis     Hepatitis     Hypercholesterolemia     Hypoglycemia due to type 1 diabetes mellitus     Hypothyroidism     Immunodeficiency due to long term immunosuppressive drug therapy     Inflammatory disease of liver     Insulin pump in place     Obesity     body mass index 30+-obesity    Renal disorder     due to type 1 diabetes mellitus    Wrist joint pain      Past Surgical History:   Procedure Laterality Date    CLUB FOOT RELEASE      OTHER SURGICAL HISTORY      surgical procedure on eye proper using laser      Family History   Problem Relation Age of Onset    Hypothyroidism Mother     Melanoma Mother         malignant    Multiple sclerosis Mother     Cancer Mother         Skin    Thyroid disease Mother     Cancer Father         Prostate    Kidney disease Maternal Grandfather     Diabetes Maternal Grandmother       Social History     Socioeconomic History     Marital status: Single   Tobacco Use    Smoking status: Never    Smokeless tobacco: Never   Vaping Use    Vaping status: Never Used   Substance and Sexual Activity    Alcohol use: Never    Drug use: Never    Sexual activity: Defer      Allergies   Allergen Reactions    Humalog [Insulin Lispro] Other (See Comments)     Severly allergic      Current Outpatient Medications on File Prior to Visit   Medication Sig Dispense Refill    ALPRAZolam (XANAX) 0.5 MG tablet Take 1 tablet by mouth As Needed (as needed for eye injections). Prn for eye injection and anxiety      aspirin 81 MG EC tablet Take 1 tablet by mouth Daily.      atorvastatin (LIPITOR) 20 MG tablet TAKE ONE TABLET BY MOUTH EVERY DAY 30 tablet 12    citalopram (CeleXA) 20 MG tablet Take 1 tablet by mouth Daily.      diphenhydrAMINE (BENADRYL) 25 mg capsule Take 1 capsule by mouth Every Night.      Dulaglutide (Trulicity) 1.5 MG/0.5ML solution pen-injector Inject 1.5 mg under the skin into the appropriate area as directed 1 (One) Time Per Week.      fluticasone (FLONASE) 50 MCG/ACT nasal spray USE 2 SPRAYS IN EACH NOSTRIL AS DIRECTED DAILY 16 g 5    gabapentin (NEURONTIN) 400 MG capsule TAKE 2 CAPSULES BY MOUTH THREE TIMES A  capsule 2    Glucagon (Gvoke HypoPen 2-Pack) 1 MG/0.2ML solution auto-injector Inject 1 mg under the skin into the appropriate area as directed As Needed (severer hypoglycemia or blood sugar less than 60.). 1 mL 2    levothyroxine (SYNTHROID, LEVOTHROID) 100 MCG tablet TAKE 1 TABLET BY MOUTH DAILY. 90 tablet 3    losartan (COZAAR) 100 MG tablet TAKE ONE TABLET BY MOUTH EVERY DAY 30 tablet 12    NovoLOG 100 UNIT/ML injection USE AS DIRECTED IN INSULIN PUMP. MAX DAILY DOSE 100 UNITS 30 mL 12    OneTouch Ultra test strip TEST 2 TIMES DAILY 100 each 3    [DISCONTINUED] meloxicam (MOBIC) 7.5 MG tablet TAKE ONE TABLET BY MOUTH DAILY WITH FOOD 30 tablet 2     No current facility-administered medications on file prior to visit.     "    Review of Systems   Constitutional:  Negative for activity change, appetite change, unexpected weight gain and unexpected weight loss.   Eyes:  Negative for visual disturbance.   Respiratory:  Negative for shortness of breath.    Cardiovascular:  Negative for chest pain.   Gastrointestinal:  Negative for abdominal pain, constipation and diarrhea.   Endocrine: Negative for polydipsia and polyuria.   Musculoskeletal:  Positive for gait problem and myalgias.   Skin:  Negative for rash and skin lesions.   Neurological:  Positive for numbness.        /60 (BP Location: Left arm, Patient Position: Sitting, Cuff Size: Adult)   Pulse 67   Ht 160 cm (62.99\")   Wt 75.9 kg (167 lb 6.4 oz)   SpO2 97%   BMI 29.66 kg/m²      Physical Exam  Constitutional:       Appearance: Normal appearance.   Cardiovascular:      Rate and Rhythm: Normal rate.   Pulmonary:      Effort: Pulmonary effort is normal.   Musculoskeletal:         General: Normal range of motion.   Skin:     General: Skin is warm and dry.      Comments: Shin with several small superficial abrasions; healing well without ulceration, surrounding erythema, or discharge.    Neurological:      General: No focal deficit present.      Mental Status: He is alert and oriented to person, place, and time.   Psychiatric:         Mood and Affect: Mood normal.         Behavior: Behavior normal.         LABS AND IMAGING  CMP:  Lab Results   Component Value Date    BUN 17 11/30/2023    CREATININE 1.03 11/30/2023    EGFR 89.0 11/30/2023    BCR 16.5 11/30/2023     (L) 11/30/2023    K 3.9 11/30/2023    CO2 22.0 11/30/2023    CALCIUM 9.8 11/30/2023    ALBUMIN 5.0 11/30/2023    BILITOT 0.5 11/30/2023    ALKPHOS 110 11/30/2023    AST 25 11/30/2023    ALT 34 11/30/2023     Lipid Panel:  Lab Results   Component Value Date    CHOL 168 11/30/2023    TRIG 53 11/30/2023    HDL 70 (H) 11/30/2023    VLDL 11 11/30/2023    LDL 87 11/30/2023     HbA1c:  Lab Results   Component Value " Date    HGBA1C 6.7 (A) 07/01/2024    HGBA1C 7.3 (A) 04/01/2024     Glucose:  Lab Results   Component Value Date    POCGLU 264 (A) 07/01/2024     Microalbumin:  Lab Results   Component Value Date    MALBCRERATIO 14.5 11/30/2023     TSH:  Lab Results   Component Value Date    TSH 1.570 11/30/2023       Assessment and Plan    Diagnoses and all orders for this visit:    1. Type 1 diabetes mellitus with multiple complications (Primary)  Assessment & Plan:  Diabetes is controlled, although concerns for occasional hypoglycemia.   A1C 6.7% today.  CGM Interpretation: Mostly stable blood sugars overnight with frequent postprandial hyperglycemia and delayed hypoglycemia; late bolusing at meals.    Encouraged contacting [employer] for upgrade to G7 and pump training; will additionally request scheduling with diabetes educator.     Rx: Continue decrease carb ratio 1:12 due to postprandial hypoglycemia-pump settings changed today.  Continue Trulicity 1.5 mg weekly.    Foot exam up-to-date.  Microalbumin up-to-date.  Ophthalmology up-to-date.    Encouraged bolus at least 15 minutes before meals for improved hyperglycemic control postprandially.  Encouraged consistency with carb counting.     Blood Glucose Goal: Blood glucose goal <150 fasting, <180 2 hr postprandial. Encouraged monitoring daily.   Discussed yearly microalbumin, annual eye examinations with Ophthalmology, and foot care.  Discussed medications, side effects and compliance.  Discussed hypoglycemia management and prevention.   Reviewed ‘ABCs’ of diabetes management (respective goals in parentheses):  A1C (<7), blood pressure (<130/80), and cholesterol (LDL <100, if CVD <70).      Orders:  -     POC Glycosylated Hemoglobin (Hb A1C)  -     POC Glucose, Blood    2. Neuropathy  Assessment & Plan:  Bilateral lower extremities.  Likely multifactorial diabetes, arthritis/DDD.  Consider additional B vitamin deficiency.  Discussed nerve conduction study-agreeable to  this.  Consider assessment for B vitamins -patient will follow-up with PCP in regards to this.      Orders:  -     EMG & Nerve Conduction Test; Future    3. Hypoglycemia due to type 1 diabetes mellitus  Assessment & Plan:  See tx above.  Continue CGM.   Will attempt request life alert.       Other orders  -     meloxicam (MOBIC) 7.5 MG tablet; TAKE ONE TABLET BY MOUTH DAILY WITH FOOD AS NEEDED.         Return in about 6 months (around 1/1/2025) for follow-up diabetes. The patient was instructed to contact the clinic with any interval questions or concerns.    Fidelina Baltazar PA-C   Endocrinology    Please note that portions of this note were completed with a voice recognition program.

## 2024-07-01 NOTE — ASSESSMENT & PLAN NOTE
Bilateral lower extremities.  Likely multifactorial diabetes, arthritis/DDD.  Consider additional B vitamin deficiency.  Discussed nerve conduction study-agreeable to this.  Consider assessment for B vitamins -patient will follow-up with PCP in regards to this.

## 2024-07-25 ENCOUNTER — DOCUMENTATION (OUTPATIENT)
Dept: DIABETES SERVICES | Facility: HOSPITAL | Age: 49
End: 2024-07-25
Payer: COMMERCIAL

## 2024-07-25 NOTE — PLAN OF CARE
Phone follow up completed and patient successful at chosen goals. Thank you for this opportunity.

## 2024-07-27 DIAGNOSIS — E10.65 UNCONTROLLED TYPE 1 DIABETES MELLITUS WITH HYPERGLYCEMIA: ICD-10-CM

## 2024-07-29 RX ORDER — GABAPENTIN 400 MG/1
CAPSULE ORAL
Qty: 180 CAPSULE | Refills: 0 | Status: SHIPPED | OUTPATIENT
Start: 2024-07-29

## 2024-07-29 RX ORDER — LOSARTAN POTASSIUM 100 MG/1
TABLET ORAL
Qty: 30 TABLET | Refills: 12 | Status: SHIPPED | OUTPATIENT
Start: 2024-07-29

## 2024-07-29 NOTE — TELEPHONE ENCOUNTER
Rx Refill Note  Requested Prescriptions     Pending Prescriptions Disp Refills    losartan (COZAAR) 100 MG tablet [Pharmacy Med Name: LOSARTAN 100 MG  Tablet] 30 tablet 12     Sig: TAKE ONE TABLET BY MOUTH EVERY DAY      Last office visit with prescribing clinician: 11/30/2023   Last telemedicine visit with prescribing clinician: Visit date not found   Next office visit with prescribing clinician: 10/14/2024                         Would you like a call back once the refill request has been completed: [] Yes [] No    If the office needs to give you a call back, can they leave a voicemail: [] Yes [] No    Kamran Peterson MA  07/29/24, 09:10 EDT

## 2024-08-26 RX ORDER — LEVOTHYROXINE SODIUM 100 UG/1
100 TABLET ORAL DAILY
Qty: 90 TABLET | Refills: 3 | Status: SHIPPED | OUTPATIENT
Start: 2024-08-26

## 2024-08-26 NOTE — TELEPHONE ENCOUNTER
Rx Refill Note  Requested Prescriptions     Pending Prescriptions Disp Refills    levothyroxine (SYNTHROID, LEVOTHROID) 100 MCG tablet [Pharmacy Med Name: LEVOTHYROXINE 100 MCG  Tablet] 30 tablet 2     Sig: TAKE 1 TABLET BY MOUTH DAILY.      Last office visit with prescribing clinician: 11/30/2023     Next office visit with prescribing clinician: 10/14/2024                           Thu Jay MA  08/26/24, 12:39 EDT

## 2024-08-28 DIAGNOSIS — E10.65 UNCONTROLLED TYPE 1 DIABETES MELLITUS WITH HYPERGLYCEMIA: ICD-10-CM

## 2024-08-28 RX ORDER — GABAPENTIN 400 MG/1
CAPSULE ORAL
Qty: 180 CAPSULE | Refills: 1 | Status: SHIPPED | OUTPATIENT
Start: 2024-08-28

## 2024-08-28 NOTE — TELEPHONE ENCOUNTER
Rx Refill Note  Requested Prescriptions     Pending Prescriptions Disp Refills    gabapentin (NEURONTIN) 400 MG capsule [Pharmacy Med Name: GABAPENTIN 400 MG CAPS 400 Capsule] 180 capsule 0     Sig: TAKE 2 CAPSULES BY MOUTH THREE TIMES A DAY      Last office visit with prescribing clinician: 7/1/2024    Next office visit with prescribing clinician: 10/14/2024

## 2024-09-23 ENCOUNTER — HOSPITAL ENCOUNTER (OUTPATIENT)
Dept: NEUROLOGY | Facility: HOSPITAL | Age: 49
Discharge: HOME | End: 2024-09-23
Admitting: PHYSICIAN ASSISTANT
Payer: COMMERCIAL

## 2024-09-23 DIAGNOSIS — G62.9 NEUROPATHY: ICD-10-CM

## 2024-09-23 PROCEDURE — 95886 MUSC TEST DONE W/N TEST COMP: CPT | Performed by: PSYCHIATRY & NEUROLOGY

## 2024-09-23 PROCEDURE — 95886 MUSC TEST DONE W/N TEST COMP: CPT

## 2024-09-23 PROCEDURE — 95911 NRV CNDJ TEST 9-10 STUDIES: CPT | Performed by: PSYCHIATRY & NEUROLOGY

## 2024-09-23 PROCEDURE — 95911 NRV CNDJ TEST 9-10 STUDIES: CPT

## 2024-10-14 ENCOUNTER — OFFICE VISIT (OUTPATIENT)
Dept: ENDOCRINOLOGY | Facility: CLINIC | Age: 49
End: 2024-10-14
Payer: COMMERCIAL

## 2024-10-14 VITALS
OXYGEN SATURATION: 97 % | HEIGHT: 62 IN | BODY MASS INDEX: 30.36 KG/M2 | SYSTOLIC BLOOD PRESSURE: 108 MMHG | HEART RATE: 72 BPM | WEIGHT: 165 LBS | DIASTOLIC BLOOD PRESSURE: 64 MMHG

## 2024-10-14 DIAGNOSIS — E10.8: Primary | ICD-10-CM

## 2024-10-14 DIAGNOSIS — Z96.41 INSULIN PUMP IN PLACE: ICD-10-CM

## 2024-10-14 DIAGNOSIS — E10.319 TYPE 1 DIABETES MELLITUS WITH RETINOPATHY, MACULAR EDEMA PRESENCE UNSPECIFIED, UNSPECIFIED LATERALITY, UNSPECIFIED RETINOPATHY SEVERITY: ICD-10-CM

## 2024-10-14 DIAGNOSIS — E10.649 HYPOGLYCEMIA DUE TO TYPE 1 DIABETES MELLITUS: ICD-10-CM

## 2024-10-14 DIAGNOSIS — E03.9 PRIMARY HYPOTHYROIDISM: ICD-10-CM

## 2024-10-14 DIAGNOSIS — E10.29 TYPE 1 DIABETES MELLITUS WITH MICROALBUMINURIA: ICD-10-CM

## 2024-10-14 DIAGNOSIS — R80.9 TYPE 1 DIABETES MELLITUS WITH MICROALBUMINURIA: ICD-10-CM

## 2024-10-14 LAB
EXPIRATION DATE: ABNORMAL
EXPIRATION DATE: ABNORMAL
GLUCOSE BLDC GLUCOMTR-MCNC: 285 MG/DL (ref 70–130)
HBA1C MFR BLD: 7.3 % (ref 4.5–5.7)
Lab: ABNORMAL
Lab: ABNORMAL

## 2024-10-14 PROCEDURE — 1159F MED LIST DOCD IN RCRD: CPT | Performed by: INTERNAL MEDICINE

## 2024-10-14 PROCEDURE — 3051F HG A1C>EQUAL 7.0%<8.0%: CPT | Performed by: INTERNAL MEDICINE

## 2024-10-14 PROCEDURE — 95251 CONT GLUC MNTR ANALYSIS I&R: CPT | Performed by: INTERNAL MEDICINE

## 2024-10-14 PROCEDURE — 99214 OFFICE O/P EST MOD 30 MIN: CPT | Performed by: INTERNAL MEDICINE

## 2024-10-14 PROCEDURE — 83036 HEMOGLOBIN GLYCOSYLATED A1C: CPT | Performed by: INTERNAL MEDICINE

## 2024-10-14 PROCEDURE — 1160F RVW MEDS BY RX/DR IN RCRD: CPT | Performed by: INTERNAL MEDICINE

## 2024-10-14 NOTE — PROGRESS NOTES
"     Office Note      Date: 10/14/2024  Patient Name: Patricio Holman  MRN: [id number]  : 1975    Chief Complaint   Patient presents with    Diabetes       History of Present Illness:   Patricio Holman is a 49 y.o. male who presents for Diabetes type 1. Diagnosed in: . Treated in past with insulin. Current treatments: Trulicity, Tandem pump and DexCom G7. Number of insulin shots per day: none - on pump. Checks blood sugar 288 times a day - on DexCom. Has low blood sugar: occasional. Aspirin use: Yes. Statin use: Yes. ACE-I/ARB use: Yes. Changes in health since last visit: none. Last eye exam 2024.     He remains on T4 100mcg qd.  He is taking this correctly.  He isn't taking any interfering meds concurrently.  He denies any sxs of hypo- or hyperthyroidism at this time.    Subjective      Diabetic Complications:  Eyes: Yes - retinopathy  Kidneys: Yes - microalbuminuria  Feet: No  Heart: No    Diet and Exercise:  Meals per day: 3  Minutes of exercise per week: 0 mins.    Review of Systems:   Review of Systems   Constitutional: Negative.    Cardiovascular: Negative.    Gastrointestinal: Negative.    Endocrine: Negative.        The following portions of the patient's history were reviewed and updated as appropriate: allergies, current medications, past family history, past medical history, past social history, past surgical history, and problem list.    Objective     Visit Vitals  /64   Pulse 72   Ht 157.5 cm (62\")   Wt 74.8 kg (165 lb)   SpO2 97%   BMI 30.18 kg/m²       Physical Exam:  Physical Exam  Constitutional:       Appearance: Normal appearance.   Neurological:      Mental Status: He is alert.         Labs:    HbA1c  Lab Results   Component Value Date    HGBA1C 7.3 (A) 10/14/2024       CMP  Lab Results   Component Value Date    GLUCOSE 242 (H) 2023    BUN 17 2023    CREATININE 1.03 2023    EGFRIFNONA 80 2021    BCR 16.5 2023    K 3.9 2023    CO2 22.0 " 11/30/2023    CALCIUM 9.8 11/30/2023    AST 25 11/30/2023    ALT 34 11/30/2023        Lipid Panel  Lab Results   Component Value Date    HDL 70 (H) 11/30/2023    LDL 87 11/30/2023    TRIG 53 11/30/2023        TSH  Lab Results   Component Value Date    TSH 1.570 11/30/2023        Hemoglobin A1C  Lab Results   Component Value Date    HGBA1C 7.3 (A) 10/14/2024        Microalbumin/Creatinine  Lab Results   Component Value Date    MALBCRERATIO 14.5 11/30/2023    MICROALBUR 1.4 11/30/2023           Assessment / Plan      Assessment & Plan:  Diagnoses and all orders for this visit:    1. Type 1 diabetes mellitus with multiple complications (Primary)  Assessment & Plan:  Diabetes is stable.  A1c not too bad at 7.3%.  Continue current treatment regimen.  Will adjust CHO ratios.  Diabetes will be reassessed in 3 months.    DexCom G7 CGM was downloaded today.  Data was reviewed from 10/1/24 to 10/14/24.  This showed good overnight control.  Having some postprandial spikes at lunch and supper.  Will adjust CHO ratios to adjust for this.  Time in range was 54%.      Orders:  -     POC Glucose, Blood  -     POC Glycosylated Hemoglobin (Hb A1C)    2. Type 1 diabetes mellitus with retinopathy, macular edema presence unspecified, unspecified laterality, unspecified retinopathy severity  Assessment & Plan:  Continue ophthalmology follow up.      3. Type 1 diabetes mellitus with microalbuminuria  Assessment & Plan:  Continue ARB.  Plan to check microalbumin next visit.      4. Hypoglycemia due to type 1 diabetes mellitus  Assessment & Plan:  Continue CGM.      5. Primary hypothyroidism  Assessment & Plan:  Continue T4 tx.  Plan for TSH next visit.    Orders:  -     TSH; Future    6. Insulin pump in place      Current Outpatient Medications   Medication Instructions    ALPRAZolam (XANAX) 0.5 mg, Oral, As Needed, Prn for eye injection and anxiety    aspirin 81 mg, Oral, Daily    atorvastatin (LIPITOR) 20 MG tablet TAKE ONE TABLET BY  MOUTH EVERY DAY    citalopram (CELEXA) 20 mg, Oral, Daily    diphenhydrAMINE (BENADRYL) 25 mg, Oral, Nightly    fluticasone (FLONASE) 50 MCG/ACT nasal spray USE 2 SPRAYS IN EACH NOSTRIL AS DIRECTED DAILY    gabapentin (NEURONTIN) 400 MG capsule TAKE 2 CAPSULES BY MOUTH THREE TIMES A DAY    Gvoke HypoPen 2-Pack 1 mg, Subcutaneous, As Needed    levothyroxine (SYNTHROID, LEVOTHROID) 100 mcg, Oral, Daily    losartan (COZAAR) 100 MG tablet TAKE ONE TABLET BY MOUTH EVERY DAY    meloxicam (MOBIC) 7.5 MG tablet TAKE ONE TABLET BY MOUTH DAILY WITH FOOD AS NEEDED.    NovoLOG 100 UNIT/ML injection USE AS DIRECTED IN INSULIN PUMP. MAX DAILY DOSE 100 UNITS    OneTouch Ultra test strip TEST 2 TIMES DAILY    Trulicity 1.5 mg, Subcutaneous, Weekly      Return in about 3 months (around 1/14/2025) for Recheck with A1c, CMP, lipid, TSH, microalbumin, foot exam - schedule with Fidelina Baltazar PA-C.    Electronically signed by: Reinaldo Sykes MD  10/14/2024

## 2024-10-14 NOTE — ASSESSMENT & PLAN NOTE
Diabetes is stable.  A1c not too bad at 7.3%.  Continue current treatment regimen.  Will adjust CHO ratios.  Diabetes will be reassessed in 3 months.    DexCom G7 CGM was downloaded today.  Data was reviewed from 10/1/24 to 10/14/24.  This showed good overnight control.  Having some postprandial spikes at lunch and supper.  Will adjust CHO ratios to adjust for this.  Time in range was 54%.

## 2024-10-30 NOTE — TELEPHONE ENCOUNTER
- Discussed options of HealthyCORE-Intensive Lifestyle Intervention Program, Very Low Calorie Diet-VLCD, and Conservative Program and the role of weight loss medications.  - Patient is interested in pursuing Conservative Program and follow up visits with medical weight management provider.  - Explained the importance of making lifestyle changes in addition to starting any anti-obesity medications.   - Initial weight loss goal of 5-10% weight loss for improved health. Weight loss can improve patient's co-morbid conditions and/or prevent weight-related complications.  - Weight is not at goal and patient has been unable to achieve a meaningful weight loss above 5% using various programs and tools for more than 6 months  - Labs reviewed from 10/2024    General Recommendations:  Nutrition:  Eat breakfast daily.  Do not skip meals.      Food log (ie.) www.myfitnesspal.com, sparkpeople.com, loseit.com, calorieking.com, etc.     Practice mindful eating.  Be sure to set aside time to eat, eat slowly, and savor your food.     Hydration:    At least 64oz of water daily.  No sugar sweetened beverages.  No juice (eat the fruit instead).     Exercise:  Studies have shown that the ideal exercise goal is somewhere between 150 to 300 minutes of moderate intensity exercise a week.  Start with exercising 10 minutes every other day and gradually increase physical activity with a goal of at least 150 minutes of moderate intensity exercise a week, divided over at least 3 days a week.  An example of this would be exercising 30 minutes a day, 5 days a week.  Resistance training can increase muscle mass and increase our resting metabolic rate.   FULL BODY resistance training is recommended 2-3 times a week.  Do not do this on consecutive days to allow for muscle recovery.     Aim for a bare minimum 5000 steps, even on days you do not exercise.     Monitoring:   Weigh yourself daily.  If this causes undue stress, then just weigh yourself  He can take the 2 together but just watch for more bleeding or bruising.  If that happens, then stop the aspirin.   once a week.  Weigh yourself the same time of the day with the same amount of clothing on.  Preferably this should be done after waking up, before you eat, and with no clothing or minimal clothing on.     Specific Goals:  Calorie goal:  [id number] dileep/day   Patient lifestyle habits were reviewed and barriers to weight loss were addressed today.  Nutrition was discussed and patient will start to go back to the guidelines that she had from her previous experience with medical weight management.  She was given a high-protein snack list to incorporate more protein within her day and to utilize instead of snacking on other snacks at night.  Activity was discussed and patient was encouraged to continue to try to incorporate more activity into her week including 30 minutes of activity 2 to 3 days.  Medications were discussed:  Phentermine to be avoided due to history of insomnia  Topiramate was discussed and patient had side effects on this medication in the past  GLP-1 medications were discussed with patient will likely not qualify based on her insurance plan and is concerned about long-term effects of these medications so would like to avoid if possible  Metformin was discussed to help with insulin resistance and patient may consider this in the future  Bupropion/naltrexone was discussed and patient was interested in this medication to help her emotional eating and cravings later in the evening.  Bupropion/naltrexone instructions:  Week 1: bupropion 150mg ONCE daily in the evening  Week 2: bupropion 150mg TWICE daily in the morning and evening  Week 3: continue with bupropion twice daily and ADD naltrexone 25mg (1/2 tablet) once daily in the morning   Week 4: continue with bupropion twice daily and INCREASE the naltrexone 25mg (1/2 tablet) twice daily in the morning and dinner      The potential side effects of bupropion/naltrexone may include: abdominal upset, headache, dizziness, trouble sleeping, increased blood  pressure, depression/anxiety, and fatigue. Please call/return if you develops symptoms of depression or anxiety. You should go to the  ER for evaluation if you develop any thoughts of harming yourself or others occur.

## 2024-11-16 DIAGNOSIS — E10.65 UNCONTROLLED TYPE 1 DIABETES MELLITUS WITH HYPERGLYCEMIA: ICD-10-CM

## 2024-11-18 RX ORDER — GABAPENTIN 400 MG/1
CAPSULE ORAL
Qty: 180 CAPSULE | Refills: 5 | Status: SHIPPED | OUTPATIENT
Start: 2024-11-18

## 2024-11-18 NOTE — TELEPHONE ENCOUNTER
Rx Refill Note  Requested Prescriptions     Pending Prescriptions Disp Refills    gabapentin (NEURONTIN) 400 MG capsule [Pharmacy Med Name: GABAPENTIN 400 MG CAPS 400 Capsule] 180 capsule 0     Sig: TAKE 2 CAPSULES BY MOUTH THREE TIMES A DAY      Last office visit with prescribing clinician: 7/1/2024     Next office visit with prescribing clinician: 1/20/2025     Diane Jay MA  11/18/24, 09:17 EST

## 2024-11-21 RX ORDER — DULAGLUTIDE 1.5 MG/.5ML
INJECTION, SOLUTION SUBCUTANEOUS
Qty: 6 ML | Refills: 0 | Status: SHIPPED | OUTPATIENT
Start: 2024-11-21

## 2024-11-21 NOTE — TELEPHONE ENCOUNTER
Rx Refill Note  Requested Prescriptions     Pending Prescriptions Disp Refills    Trulicity 1.5 MG/0.5ML solution auto-injector [Pharmacy Med Name: Trulicity Subcutaneous Solution Auto-injector 1.5 MG/0.5ML] 8 mL 0     Sig: INJECT 1.5 MG (0.5ML) UNDER THE SKIN ONCE A WEEK      Last office visit with prescribing clinician: 10/14/2024     Next office visit with prescribing clinician: 1/20/2025

## 2025-01-09 ENCOUNTER — TELEPHONE (OUTPATIENT)
Dept: ENDOCRINOLOGY | Facility: CLINIC | Age: 50
End: 2025-01-09
Payer: COMMERCIAL

## 2025-01-09 NOTE — TELEPHONE ENCOUNTER
Spoke with tom - read him the message.  He voiced understanding In starting lantus 15 units a day in addition to sliding scale humalog at meals.

## 2025-01-09 NOTE — TELEPHONE ENCOUNTER
INDERJIT TYSON IS 4TH YEAR MEDICAL STUDENT WORKING WITH DR. MARIAH TAI AT [facility] HAS COME QUESTIONS ABOUT PATIENTS DIABETIC TREATMENT.     PATIENT DOES HAVE INSULIN PUMP, BUT AT THE [facility] THEY ARE NOT ABLE TO TREAT WITH THE PUMP. THEY HAVE BEEN DOING SLINDING SCALES OF INSULIN QID. HE RECEIVED 30 UNITS YESTERDAY AND HIS SUGARS WERE BOUNCING AROUND [phone]. THEY WOULD LIKE TO START THE PATIENT ON LANTUS BUT THE PATIENT IS STATING HE IS ALLERGIC TO LANTUS AND OTHER DIABETIC MEDICATIONS. THEY WOULD LIKE A CALL BACK TO DISCUSS PATIENTS ALLERGIES TO DIABETIC MEDICINES AND WHAT RECOMMENDATION THAT DR. ARREDONDO WOULD RECOMMEND FOR PATIENT WHILE HE IS AT THE HOSPITAL. IF PATIENT IS ALLERGIC TO LANTUS THEY CAN DO 70/30 REGIMEN. THEY CAN ALSO TRY TRULICITY IF DR. VANN RECOMMENDS IT BUT THEY WOULD PREFER PATIENT TO HAVE LANTUS OR THE 70/30. LEW PHONE NUMBER [phone]

## 2025-01-09 NOTE — TELEPHONE ENCOUNTER
Please return call. Jules's usual provider is out of office and I am not familiar with his history. Documentation in EPIC was reviewed.  Patient's  only documented allergy in epic is to Humalog.  Per review of documentation this resulted in sweats/headache.  Our system does not have any other documented allergies. He has not been prescribed any basal insulin in EPIC (since 2020).     Furthermore, on review of documentation from prior system when patient was followed at [facility], patient appears to have been prescribed lantus as his basal insulin until the time when he was transitioned to insulin pump in 2014.  I did not see any documentation of allergy to lantus.    Given diagnosis of type 1 diabetes, he will likely to continue to have significant variability in glucose until he is on a basal insulin.  70/30 is not ideal in a patient with type 1 diabetes.    Patient was taking Trulicity at the time of last visit at a dose of 1.5 mg weekly.  However, even if this was resumed, he needs basal insulin.    At time of last pump download he was receiving 18 units of basal insulin daily via pump.  Of note, I do usually recommend starting at a slightly lower dose when transitioning off pump to minimize risk of low sugars.  (For example starting at 15 units).

## 2025-01-22 ENCOUNTER — TELEPHONE (OUTPATIENT)
Dept: ENDOCRINOLOGY | Facility: CLINIC | Age: 50
End: 2025-01-22
Payer: COMMERCIAL

## 2025-01-22 NOTE — TELEPHONE ENCOUNTER
PATIENT RETURNED OUR CALL. HE IS CURRENTLY TAKING TRULICITY 1.5/0.5 MG AS HE TAKES IT ONCE A WEEK. PATIENTS NUMBER [phone]

## 2025-01-27 ENCOUNTER — DOCUMENTATION (OUTPATIENT)
Dept: ENDOCRINOLOGY | Facility: CLINIC | Age: 50
End: 2025-01-27
Payer: COMMERCIAL

## 2025-01-27 RX ORDER — CALCIUM CARB/VITAMIN D3/VIT K1 500-100-40
TABLET,CHEWABLE ORAL
Qty: 20 EACH | Refills: 11 | Status: SHIPPED | OUTPATIENT
Start: 2025-01-27

## 2025-01-27 RX ORDER — BLOOD SUGAR DIAGNOSTIC
STRIP MISCELLANEOUS
Qty: 100 EACH | Refills: 11 | Status: SHIPPED | OUTPATIENT
Start: 2025-01-27

## 2025-01-27 NOTE — PROGRESS NOTES
Pt and mother came by office for assistance with restarting pump after d/c from hospital. We were able to resume pump and Dexcom G7, in CIQ. Pt has fup appt with MD next week.

## 2025-01-28 ENCOUNTER — OFFICE VISIT (OUTPATIENT)
Dept: ENDOCRINOLOGY | Facility: CLINIC | Age: 50
End: 2025-01-28
Payer: COMMERCIAL

## 2025-01-28 VITALS
SYSTOLIC BLOOD PRESSURE: 104 MMHG | HEIGHT: 62 IN | DIASTOLIC BLOOD PRESSURE: 62 MMHG | HEART RATE: 86 BPM | BODY MASS INDEX: 33.9 KG/M2 | OXYGEN SATURATION: 95 % | WEIGHT: 184.2 LBS

## 2025-01-28 DIAGNOSIS — E10.649 HYPOGLYCEMIA DUE TO TYPE 1 DIABETES MELLITUS: ICD-10-CM

## 2025-01-28 DIAGNOSIS — E10.8: Primary | ICD-10-CM

## 2025-01-28 DIAGNOSIS — E10.319 TYPE 1 DIABETES MELLITUS WITH RETINOPATHY, MACULAR EDEMA PRESENCE UNSPECIFIED, UNSPECIFIED LATERALITY, UNSPECIFIED RETINOPATHY SEVERITY: ICD-10-CM

## 2025-01-28 DIAGNOSIS — E03.9 PRIMARY HYPOTHYROIDISM: ICD-10-CM

## 2025-01-28 LAB
EXPIRATION DATE: ABNORMAL
GLUCOSE BLDC GLUCOMTR-MCNC: 450 MG/DL (ref 70–130)
Lab: ABNORMAL

## 2025-01-28 PROCEDURE — 99214 OFFICE O/P EST MOD 30 MIN: CPT | Performed by: INTERNAL MEDICINE

## 2025-01-28 PROCEDURE — 82947 ASSAY GLUCOSE BLOOD QUANT: CPT | Performed by: INTERNAL MEDICINE

## 2025-01-28 PROCEDURE — 1160F RVW MEDS BY RX/DR IN RCRD: CPT | Performed by: INTERNAL MEDICINE

## 2025-01-28 PROCEDURE — 1159F MED LIST DOCD IN RCRD: CPT | Performed by: INTERNAL MEDICINE

## 2025-01-28 RX ORDER — DIVALPROEX SODIUM 500 MG/1
500 TABLET, DELAYED RELEASE ORAL 2 TIMES DAILY
COMMUNITY

## 2025-01-28 RX ORDER — OLANZAPINE 5 MG/1
5 TABLET, ORALLY DISINTEGRATING ORAL EVERY 6 HOURS PRN
COMMUNITY
Start: 2025-01-20

## 2025-01-28 RX ORDER — OLANZAPINE 10 MG/1
10 TABLET ORAL EVERY MORNING
COMMUNITY
Start: 2025-01-20

## 2025-01-28 RX ORDER — OLANZAPINE 20 MG/1
20 TABLET ORAL NIGHTLY
COMMUNITY
Start: 2025-01-20

## 2025-01-28 NOTE — ASSESSMENT & PLAN NOTE
Diabetes is worsening.  Having some higher glucose readings the last 2 days.  He took an insulin shot last night and glucose came down.  Continue insulin pump.  Change out insulin cartridge.  Diabetes will be reassessed in 3 months.    Not enough data on CGM download for an interpretation today.

## 2025-01-28 NOTE — PROGRESS NOTES
"     Office Note      Date: 2025  Patient Name: Patricio Holman  MRN: [id number]  : 1975    Chief Complaint   Patient presents with    Diabetes     Type I    Hypothyroidism    Hypoglycemia       History of Present Illness:   Patircio Holman is a 49 y.o. male who presents for Diabetes type 1. Diagnosed in: . Treated in past with insulin. Current treatments: Trulicity, Tandem pump and DexCom G7. Number of insulin shots per day: none - on pump. Checks blood sugar 288 times a day - on DexCom. Has low blood sugar: occasional. Aspirin use: Yes. Statin use: Yes. ACE-I/ARB use: Yes. Changes in health since last visit: admission for psychiatric reasons - on olanzapine. Last eye exam 2024.     He remains on T4 100mcg qd.  He is taking this correctly.  He isn't taking any interfering meds concurrently.  He denies any sxs of hypo- or hyperthyroidism at this time.    Subjective      Diabetic Complications:  Eyes: Yes - retinopathy  Kidneys: Yes - microalbuminuria  Feet: No  Heart: No    Diet and Exercise:  Meals per day: 3  Minutes of exercise per week: 0 mins.    Review of Systems:   Review of Systems   Constitutional: Negative.    Cardiovascular: Negative.    Gastrointestinal: Negative.    Endocrine: Negative.        The following portions of the patient's history were reviewed and updated as appropriate: allergies, current medications, past family history, past medical history, past social history, past surgical history, and problem list.    Objective     Visit Vitals  /62 (BP Location: Right arm, Patient Position: Sitting, Cuff Size: Adult)   Pulse 86   Ht 157.5 cm (62.01\")   Wt 83.6 kg (184 lb 3.2 oz)   SpO2 95%   BMI 33.68 kg/m²       Physical Exam:  Physical Exam  Constitutional:       Appearance: Normal appearance.   Psychiatric:      Comments: Somewhat slurred speech         Labs:    HbA1c  Lab Results   Component Value Date    HGBA1C 7.1 (H) 2025       CMP  Lab Results   Component " "Value Date    GLUCOSE 156 (H) 01/18/2025    BUN 17 11/30/2023    CREATININE 1.03 11/30/2023    EGFRIFNONA 80 06/28/2021    BCR 16.5 11/30/2023    K 3.6 01/18/2025    CO2 22.0 11/30/2023    CALCIUM 9.8 11/30/2023    AST 25 11/30/2023    ALT 34 11/30/2023        Lipid Panel  Lab Results   Component Value Date    HDL Cholesterol 70 (H) 11/30/2023    LDL Cholesterol  87 11/30/2023    LDL/HDL Ratio 1.25 11/30/2023    Triglycerides 53 11/30/2023        TSH  Lab Results   Component Value Date    TSH 1.570 11/30/2023    FREET4 1.2 01/09/2025        Hemoglobin A1C  No components found for: \"HGBA1C\"     Microalbumin/Creatinine  Lab Results   Component Value Date    MALBCRERATIO 14.5 11/30/2023    MICROALBUR 1.4 11/30/2023           Assessment / Plan      Assessment & Plan:  Diagnoses and all orders for this visit:    1. Type 1 diabetes mellitus with multiple complications (Primary)  Assessment & Plan:  Diabetes is worsening.  Having some higher glucose readings the last 2 days.  He took an insulin shot last night and glucose came down.  Continue insulin pump.  Change out insulin cartridge.  Diabetes will be reassessed in 3 months.    Not enough data on CGM download for an interpretation today.    Orders:  -     POC Glucose, Blood    2. Hypoglycemia due to type 1 diabetes mellitus  Assessment & Plan:  Continue CGM.      3. Type 1 diabetes mellitus with retinopathy, macular edema presence unspecified, unspecified laterality, unspecified retinopathy severity  Assessment & Plan:  Continue ophthalmology follow up.      4. Primary hypothyroidism  Assessment & Plan:  Continue T4 tx.  Recent TSH okay.        Current Outpatient Medications   Medication Instructions    ALPRAZolam (XANAX) 0.5 mg, As Needed    aspirin 81 mg, Daily    atorvastatin (LIPITOR) 20 MG tablet TAKE ONE TABLET BY MOUTH EVERY DAY    diphenhydrAMINE (BENADRYL) 25 mg, Nightly    divalproex (DEPAKOTE) 500 mg, 2 Times Daily    Dulaglutide (Trulicity) 1.5 MG/0.5ML " "solution auto-injector INJECT 1.5 MG (0.5ML) UNDER THE SKIN ONCE A WEEK    fluticasone (FLONASE) 50 MCG/ACT nasal spray USE 2 SPRAYS IN EACH NOSTRIL AS DIRECTED DAILY    gabapentin (NEURONTIN) 400 MG capsule TAKE 2 CAPSULES BY MOUTH THREE TIMES A DAY    glucose blood (OneTouch Ultra) test strip Test 2 times daily dx e10.65    Gvoke HypoPen 2-Pack 1 mg, Subcutaneous, As Needed    Insulin Syringe 31G X 5/16\" 1 ML misc Use to inject insulin 4 times a day in case of pump failure    levothyroxine (SYNTHROID, LEVOTHROID) 100 mcg, Oral, Daily    losartan (COZAAR) 100 MG tablet TAKE ONE TABLET BY MOUTH EVERY DAY    meloxicam (MOBIC) 7.5 MG tablet TAKE ONE TABLET BY MOUTH DAILY WITH FOOD AS NEEDED.    NovoLOG 100 UNIT/ML injection USE AS DIRECTED IN INSULIN PUMP. MAX DAILY DOSE 100 UNITS    OLANZapine (ZYPREXA) 10 mg, Every Morning    OLANZapine (ZYPREXA) 20 mg, Nightly    OLANZapine zydis (ZYPREXA) 5 mg, Every 6 Hours PRN    Trulicity 1.5 mg, Subcutaneous, Weekly      Return in about 6 months (around 7/28/2025) for Recheck with A1c, TSH.    Electronically signed by: Reinaldo Sykes MD  01/28/2025  "

## 2025-03-18 ENCOUNTER — TELEPHONE (OUTPATIENT)
Dept: ENDOCRINOLOGY | Facility: CLINIC | Age: 50
End: 2025-03-18
Payer: COMMERCIAL

## 2025-03-18 ENCOUNTER — TELEPHONE (OUTPATIENT)
Dept: ENDOCRINOLOGY | Facility: CLINIC | Age: 50
End: 2025-03-18

## 2025-03-18 RX ORDER — DULAGLUTIDE 1.5 MG/.5ML
1.5 INJECTION, SOLUTION SUBCUTANEOUS WEEKLY
Qty: 6 ML | Refills: 3 | Status: SHIPPED | OUTPATIENT
Start: 2025-03-18

## 2025-03-18 NOTE — TELEPHONE ENCOUNTER
Rx Refill Note  Requested Prescriptions      No prescriptions requested or ordered in this encounter        Last office visit with prescribing clinician: 1/28/2025      Next office visit with prescribing clinician: 8/14/2025       Ramila Alfaro (Jodi)  03/18/25, 07:32 EDT     Trulicity refill to [facility] pt assistance

## 2025-03-18 NOTE — TELEPHONE ENCOUNTER
Spoke to pt-he was asking for a sample of insulin pen.  He is off his pump and is having trouble using vial/syringe.  Put 2 pens and pen needles for him to  and try.  Also per dr noyola he needs to be taking long acting insulin while not on his pump.  Gave 2 lantus pens to take 18 units every 24 hrs

## 2025-03-18 NOTE — TELEPHONE ENCOUNTER
Caller: [facility]    Relationship:     Best call back number: [phone] EXTENSION 1     What is the best time to reach you: ANYTIME    What was the call regarding: MELANIE FROM [facility] CALLED STATING SHE FAXED OVER PT TRULICITY PAPERWORK ON THE 14TH AND IS WONDERING IF OFFICE RECEIVED PAPERWORK. PLEASE ADVISE.

## 2025-03-27 NOTE — TELEPHONE ENCOUNTER
Caller: [facility]    Relationship to patient:     [phone]     Patient is needing: SHAWN LAUREN CALLED BACK IN ASKING IF THE FORMS FOR THE PT TRULICITY CAN BE REFAXED. THEY STATED THAT ALL OF THE PAGES ARE NEEDING TO BE COMPLETED AND SIGNED. PLEASE ADVISE AND FAX PAPER WORK.

## 2025-04-04 RX ORDER — ATORVASTATIN CALCIUM 20 MG/1
20 TABLET, FILM COATED ORAL DAILY
Qty: 30 TABLET | Refills: 5 | Status: SHIPPED | OUTPATIENT
Start: 2025-04-04

## 2025-04-04 NOTE — TELEPHONE ENCOUNTER
Rx Refill Note  Requested Prescriptions     Pending Prescriptions Disp Refills    atorvastatin (LIPITOR) 20 MG tablet [Pharmacy Med Name: ATORVASTATIN CALCIUM 20 MG 20 Tablet] 30 tablet 12     Sig: TAKE ONE TABLET BY MOUTH EVERY DAY      Last office visit with prescribing clinician: Visit date not found     Next office visit with prescribing clinician: 8/14/2025    Shanna Jenkins MA  04/04/25, 11:58 EDT

## 2025-04-07 ENCOUNTER — TELEPHONE (OUTPATIENT)
Dept: ENDOCRINOLOGY | Facility: CLINIC | Age: 50
End: 2025-04-07
Payer: COMMERCIAL

## 2025-04-07 RX ORDER — INSULIN ASPART 100 [IU]/ML
10 INJECTION, SOLUTION INTRAVENOUS; SUBCUTANEOUS
Qty: 30 ML | Refills: 1 | Status: SHIPPED | OUTPATIENT
Start: 2025-04-07

## 2025-04-07 RX ORDER — INSULIN GLARGINE 100 [IU]/ML
18 INJECTION, SOLUTION SUBCUTANEOUS NIGHTLY
Qty: 30 ML | Refills: 1 | Status: SHIPPED | OUTPATIENT
Start: 2025-04-07

## 2025-04-07 NOTE — TELEPHONE ENCOUNTER
"    Hub staff attempted to follow warm transfer process and was unsuccessful     Caller: Patricio Holman \"Guillermo\"    Relationship to patient: Self    Best call back number: [phone]    Patient is needing: RETURNING CALL TO EUN   "

## 2025-04-07 NOTE — TELEPHONE ENCOUNTER
Spoke with patient.  He states he is no longer using his pump.  Asking for rx for Novolog pens and Lantus pens to be sent to pharmacy.

## 2025-04-08 RX ORDER — PEN NEEDLE, DIABETIC 30 GX3/16"
1 NEEDLE, DISPOSABLE MISCELLANEOUS
Qty: 200 EACH | Refills: 11 | Status: SHIPPED | OUTPATIENT
Start: 2025-04-08

## 2025-04-08 NOTE — TELEPHONE ENCOUNTER
"Provider: DR ARREDONDO    Caller: Patricio Holman \"Guillermo\"    Relationship to Patient: Self    Reason for Call: PATIENT WOULD LIKE TO SPEAK WITH EITHER JESSICA OR EUN CONCERNING THIS MEDICATION ISSUE. PLEASE CALL PATIENT    "

## 2025-04-08 NOTE — TELEPHONE ENCOUNTER
Spoke to pt-he wants to divide his lantus to 9 units bid.  Ok per dr noyola.  Also needed pen needles

## 2025-04-15 RX ORDER — PEN NEEDLE, DIABETIC 29 G X1/2"
NEEDLE, DISPOSABLE MISCELLANEOUS
Qty: 120 EACH | Refills: 11 | Status: SHIPPED | OUTPATIENT
Start: 2025-04-15

## 2025-05-12 ENCOUNTER — PRIOR AUTHORIZATION (OUTPATIENT)
Dept: ENDOCRINOLOGY | Facility: CLINIC | Age: 50
End: 2025-05-12
Payer: COMMERCIAL

## 2025-05-12 RX ORDER — FLUTICASONE PROPIONATE 50 MCG
SPRAY, SUSPENSION (ML) NASAL
Qty: 16 G | Refills: 2 | Status: SHIPPED | OUTPATIENT
Start: 2025-05-12

## 2025-05-12 NOTE — TELEPHONE ENCOUNTER
Rx Refill Note  Requested Prescriptions     Pending Prescriptions Disp Refills    fluticasone (FLONASE) 50 MCG/ACT nasal spray [Pharmacy Med Name: FLUTICASONE PROP 50 MCG SPR 50 CALVIN] 16 g 5     Sig: USE 2 SPRAYS IN EACH NOSTRIL AS DIRECTED DAILY      Last office visit with prescribing clinician: 1/28/2025     Next office visit with prescribing clinician: 8/14/2025     Shanna Jenkins MA  05/12/25, 12:24 EDT

## 2025-05-12 NOTE — TELEPHONE ENCOUNTER
BETY ALEJANDRO (Key: [id number])  PA Case ID #: [id number]  Need Help? Call us at [phone]  Outcome  Approved today by [place] [employer] Med[employer]act 2017  The request has been approved. The authorization is effective from 05/12/2025 to 05/12/2026, as long as the member is enrolled in their current health plan. A written notification letter will follow with additional details.  Effective Date: 5/12/2025  Authorization Expiration Date: 5/12/2026  Drug  Gvoke HypoPen 2-Pack 1MG/0.2ML auto-injectors  ePA cloud logo  Form  Med[employer] [place] Medicaid ePA Form 2017 NCPDP

## 2025-07-15 ENCOUNTER — TELEPHONE (OUTPATIENT)
Dept: ENDOCRINOLOGY | Facility: CLINIC | Age: 50
End: 2025-07-15

## 2025-08-14 ENCOUNTER — OFFICE VISIT (OUTPATIENT)
Dept: ENDOCRINOLOGY | Facility: CLINIC | Age: 50
End: 2025-08-14
Payer: COMMERCIAL

## 2025-08-14 VITALS
WEIGHT: 157 LBS | HEART RATE: 75 BPM | DIASTOLIC BLOOD PRESSURE: 60 MMHG | HEIGHT: 62 IN | BODY MASS INDEX: 28.89 KG/M2 | OXYGEN SATURATION: 98 % | SYSTOLIC BLOOD PRESSURE: 112 MMHG

## 2025-08-14 DIAGNOSIS — E03.9 PRIMARY HYPOTHYROIDISM: ICD-10-CM

## 2025-08-14 DIAGNOSIS — E10.319 TYPE 1 DIABETES MELLITUS WITH RETINOPATHY, MACULAR EDEMA PRESENCE UNSPECIFIED, UNSPECIFIED LATERALITY, UNSPECIFIED RETINOPATHY SEVERITY: ICD-10-CM

## 2025-08-14 DIAGNOSIS — E10.8: Primary | ICD-10-CM

## 2025-08-14 DIAGNOSIS — R80.9 TYPE 1 DIABETES MELLITUS WITH MICROALBUMINURIA: ICD-10-CM

## 2025-08-14 DIAGNOSIS — E10.29 TYPE 1 DIABETES MELLITUS WITH MICROALBUMINURIA: ICD-10-CM

## 2025-08-14 LAB
EXPIRATION DATE: ABNORMAL
EXPIRATION DATE: ABNORMAL
GLUCOSE BLDC GLUCOMTR-MCNC: 366 MG/DL (ref 70–130)
HBA1C MFR BLD: 7.8 % (ref 4.5–5.7)
Lab: ABNORMAL
Lab: ABNORMAL

## 2025-08-14 PROCEDURE — 84443 ASSAY THYROID STIM HORMONE: CPT | Performed by: INTERNAL MEDICINE

## 2025-08-14 PROCEDURE — 82043 UR ALBUMIN QUANTITATIVE: CPT | Performed by: INTERNAL MEDICINE

## 2025-08-14 PROCEDURE — 82570 ASSAY OF URINE CREATININE: CPT | Performed by: INTERNAL MEDICINE

## 2025-08-14 RX ORDER — HYDROXYZINE PAMOATE 25 MG/1
25 CAPSULE ORAL 3 TIMES DAILY PRN
COMMUNITY

## 2025-08-15 ENCOUNTER — RESULTS FOLLOW-UP (OUTPATIENT)
Dept: ENDOCRINOLOGY | Facility: CLINIC | Age: 50
End: 2025-08-15
Payer: COMMERCIAL

## 2025-08-15 LAB
ALBUMIN UR-MCNC: 1.2 MG/DL
CREAT UR-MCNC: 118.5 MG/DL
MICROALBUMIN/CREAT UR: 10.1 MG/G (ref 0–29)
TSH SERPL DL<=0.05 MIU/L-ACNC: 0.27 UIU/ML (ref 0.27–4.2)